# Patient Record
Sex: MALE | Race: WHITE | Employment: FULL TIME | URBAN - METROPOLITAN AREA
[De-identification: names, ages, dates, MRNs, and addresses within clinical notes are randomized per-mention and may not be internally consistent; named-entity substitution may affect disease eponyms.]

---

## 2017-03-24 RX ORDER — VALSARTAN 320 MG/1
320 TABLET ORAL DAILY
Qty: 90 TABLET | Refills: 0 | Status: SHIPPED | OUTPATIENT
Start: 2017-03-24 | End: 2017-05-15 | Stop reason: SDUPTHER

## 2017-05-15 RX ORDER — VALSARTAN 320 MG/1
TABLET ORAL
Qty: 90 TABLET | Refills: 0 | Status: SHIPPED | OUTPATIENT
Start: 2017-05-15 | End: 2017-10-12 | Stop reason: SDUPTHER

## 2017-12-23 DIAGNOSIS — I10 ESSENTIAL HYPERTENSION: ICD-10-CM

## 2017-12-23 DIAGNOSIS — E78.5 DYSLIPIDEMIA: ICD-10-CM

## 2017-12-23 LAB
ALBUMIN SERPL-MCNC: 4.3 G/DL (ref 3.9–4.9)
ALP BLD-CCNC: 72 U/L (ref 35–104)
ALT SERPL-CCNC: 28 U/L (ref 0–41)
ANION GAP SERPL CALCULATED.3IONS-SCNC: 10 MEQ/L (ref 7–13)
AST SERPL-CCNC: 16 U/L (ref 0–40)
BASOPHILS ABSOLUTE: 0.1 K/UL (ref 0–0.2)
BASOPHILS RELATIVE PERCENT: 0.8 %
BILIRUB SERPL-MCNC: 0.4 MG/DL (ref 0–1.2)
BUN BLDV-MCNC: 15 MG/DL (ref 6–20)
CALCIUM SERPL-MCNC: 9.2 MG/DL (ref 8.6–10.2)
CHLORIDE BLD-SCNC: 100 MEQ/L (ref 98–107)
CHOLESTEROL, TOTAL: 167 MG/DL (ref 0–199)
CO2: 29 MEQ/L (ref 22–29)
CREAT SERPL-MCNC: 0.78 MG/DL (ref 0.7–1.2)
EOSINOPHILS ABSOLUTE: 0.2 K/UL (ref 0–0.7)
EOSINOPHILS RELATIVE PERCENT: 1.8 %
GFR AFRICAN AMERICAN: >60
GFR NON-AFRICAN AMERICAN: >60
GLOBULIN: 2.8 G/DL (ref 2.3–3.5)
GLUCOSE BLD-MCNC: 102 MG/DL (ref 74–109)
HCT VFR BLD CALC: 43.2 % (ref 42–52)
HDLC SERPL-MCNC: 38 MG/DL (ref 40–59)
HEMOGLOBIN: 14.3 G/DL (ref 14–18)
LDL CHOLESTEROL CALCULATED: 117 MG/DL (ref 0–129)
LYMPHOCYTES ABSOLUTE: 2.5 K/UL (ref 1–4.8)
LYMPHOCYTES RELATIVE PERCENT: 28.4 %
MCH RBC QN AUTO: 28.8 PG (ref 27–31.3)
MCHC RBC AUTO-ENTMCNC: 33.1 % (ref 33–37)
MCV RBC AUTO: 86.9 FL (ref 80–100)
MONOCYTES ABSOLUTE: 0.6 K/UL (ref 0.2–0.8)
MONOCYTES RELATIVE PERCENT: 6.6 %
NEUTROPHILS ABSOLUTE: 5.4 K/UL (ref 1.4–6.5)
NEUTROPHILS RELATIVE PERCENT: 62.4 %
PDW BLD-RTO: 13.8 % (ref 11.5–14.5)
PLATELET # BLD: 233 K/UL (ref 130–400)
POTASSIUM SERPL-SCNC: 4.3 MEQ/L (ref 3.5–5.1)
RBC # BLD: 4.97 M/UL (ref 4.7–6.1)
SODIUM BLD-SCNC: 139 MEQ/L (ref 132–144)
TOTAL PROTEIN: 7.1 G/DL (ref 6.4–8.1)
TRIGL SERPL-MCNC: 60 MG/DL (ref 0–200)
WBC # BLD: 8.7 K/UL (ref 4.8–10.8)

## 2018-01-03 ENCOUNTER — OFFICE VISIT (OUTPATIENT)
Dept: FAMILY MEDICINE CLINIC | Age: 33
End: 2018-01-03

## 2018-01-03 VITALS
HEIGHT: 74 IN | WEIGHT: 315 LBS | DIASTOLIC BLOOD PRESSURE: 84 MMHG | TEMPERATURE: 98.4 F | BODY MASS INDEX: 40.43 KG/M2 | SYSTOLIC BLOOD PRESSURE: 134 MMHG | OXYGEN SATURATION: 98 % | RESPIRATION RATE: 14 BRPM | HEART RATE: 84 BPM

## 2018-01-03 DIAGNOSIS — E78.5 DYSLIPIDEMIA: ICD-10-CM

## 2018-01-03 DIAGNOSIS — I10 ESSENTIAL HYPERTENSION: Primary | ICD-10-CM

## 2018-01-03 PROCEDURE — G8427 DOCREV CUR MEDS BY ELIG CLIN: HCPCS | Performed by: FAMILY MEDICINE

## 2018-01-03 PROCEDURE — 99213 OFFICE O/P EST LOW 20 MIN: CPT | Performed by: FAMILY MEDICINE

## 2018-01-03 PROCEDURE — G8417 CALC BMI ABV UP PARAM F/U: HCPCS | Performed by: FAMILY MEDICINE

## 2018-01-03 PROCEDURE — G8484 FLU IMMUNIZE NO ADMIN: HCPCS | Performed by: FAMILY MEDICINE

## 2018-01-03 PROCEDURE — 1036F TOBACCO NON-USER: CPT | Performed by: FAMILY MEDICINE

## 2018-01-03 RX ORDER — VALSARTAN 320 MG/1
320 TABLET ORAL DAILY
Qty: 90 TABLET | Refills: 1 | Status: SHIPPED | OUTPATIENT
Start: 2018-01-03 | End: 2018-06-27 | Stop reason: SDUPTHER

## 2018-01-03 ASSESSMENT — ENCOUNTER SYMPTOMS
SHORTNESS OF BREATH: 0
ABDOMINAL PAIN: 0

## 2018-01-03 NOTE — PROGRESS NOTES
reg. No murmur. No gallops   Pulses:Radials 2+ equal               Poster tib 1+ equal  Extremities:no edema in either leg  Gen: In no acute distress  Abdomen; B.S present. Soft  Non tender. No hepatosplenomegaly. No masses   Assessment:      1. Essential hypertension     2.  Dyslipidemia       Plan:      Orders Placed This Encounter   Medications    valsartan (DIOVAN) 320 MG tablet     Sig: Take 1 tablet by mouth daily     Dispense:  90 tablet     Refill:  1   f/u in 6 months

## 2018-06-27 ENCOUNTER — OFFICE VISIT (OUTPATIENT)
Dept: FAMILY MEDICINE CLINIC | Age: 33
End: 2018-06-27
Payer: COMMERCIAL

## 2018-06-27 VITALS
OXYGEN SATURATION: 100 % | RESPIRATION RATE: 14 BRPM | HEIGHT: 74 IN | HEART RATE: 90 BPM | WEIGHT: 315 LBS | DIASTOLIC BLOOD PRESSURE: 86 MMHG | SYSTOLIC BLOOD PRESSURE: 138 MMHG | TEMPERATURE: 97.6 F | BODY MASS INDEX: 40.43 KG/M2

## 2018-06-27 DIAGNOSIS — E78.5 DYSLIPIDEMIA: ICD-10-CM

## 2018-06-27 DIAGNOSIS — I10 ESSENTIAL HYPERTENSION: Primary | ICD-10-CM

## 2018-06-27 PROCEDURE — G8427 DOCREV CUR MEDS BY ELIG CLIN: HCPCS | Performed by: FAMILY MEDICINE

## 2018-06-27 PROCEDURE — G8417 CALC BMI ABV UP PARAM F/U: HCPCS | Performed by: FAMILY MEDICINE

## 2018-06-27 PROCEDURE — 1036F TOBACCO NON-USER: CPT | Performed by: FAMILY MEDICINE

## 2018-06-27 PROCEDURE — 99213 OFFICE O/P EST LOW 20 MIN: CPT | Performed by: FAMILY MEDICINE

## 2018-06-27 RX ORDER — METOPROLOL SUCCINATE 25 MG/1
25 TABLET, EXTENDED RELEASE ORAL DAILY
Qty: 30 TABLET | Refills: 1 | Status: SHIPPED | OUTPATIENT
Start: 2018-06-27 | End: 2018-08-08 | Stop reason: SDUPTHER

## 2018-06-27 RX ORDER — VALSARTAN 320 MG/1
320 TABLET ORAL DAILY
Qty: 90 TABLET | Refills: 1 | Status: SHIPPED | OUTPATIENT
Start: 2018-06-27 | End: 2018-08-08

## 2018-06-27 ASSESSMENT — ENCOUNTER SYMPTOMS
SHORTNESS OF BREATH: 0
ABDOMINAL PAIN: 0

## 2018-06-27 ASSESSMENT — PATIENT HEALTH QUESTIONNAIRE - PHQ9
SUM OF ALL RESPONSES TO PHQ QUESTIONS 1-9: 0
2. FEELING DOWN, DEPRESSED OR HOPELESS: 0
SUM OF ALL RESPONSES TO PHQ9 QUESTIONS 1 & 2: 0
1. LITTLE INTEREST OR PLEASURE IN DOING THINGS: 0

## 2018-07-30 ENCOUNTER — TELEPHONE (OUTPATIENT)
Dept: FAMILY MEDICINE CLINIC | Age: 33
End: 2018-07-30

## 2018-07-30 RX ORDER — IRBESARTAN 300 MG/1
300 TABLET ORAL DAILY
Qty: 30 TABLET | Refills: 3 | Status: SHIPPED | OUTPATIENT
Start: 2018-07-30 | End: 2018-08-08 | Stop reason: SDUPTHER

## 2018-08-08 ENCOUNTER — OFFICE VISIT (OUTPATIENT)
Dept: FAMILY MEDICINE CLINIC | Age: 33
End: 2018-08-08
Payer: COMMERCIAL

## 2018-08-08 VITALS
TEMPERATURE: 97.8 F | RESPIRATION RATE: 14 BRPM | HEIGHT: 74 IN | DIASTOLIC BLOOD PRESSURE: 92 MMHG | BODY MASS INDEX: 40.43 KG/M2 | HEART RATE: 94 BPM | OXYGEN SATURATION: 100 % | WEIGHT: 315 LBS | SYSTOLIC BLOOD PRESSURE: 138 MMHG

## 2018-08-08 DIAGNOSIS — I10 ESSENTIAL HYPERTENSION: Primary | ICD-10-CM

## 2018-08-08 PROCEDURE — G8427 DOCREV CUR MEDS BY ELIG CLIN: HCPCS | Performed by: FAMILY MEDICINE

## 2018-08-08 PROCEDURE — 1036F TOBACCO NON-USER: CPT | Performed by: FAMILY MEDICINE

## 2018-08-08 PROCEDURE — G8417 CALC BMI ABV UP PARAM F/U: HCPCS | Performed by: FAMILY MEDICINE

## 2018-08-08 PROCEDURE — 99213 OFFICE O/P EST LOW 20 MIN: CPT | Performed by: FAMILY MEDICINE

## 2018-08-08 RX ORDER — IRBESARTAN 300 MG/1
300 TABLET ORAL DAILY
Qty: 90 TABLET | Refills: 0 | Status: SHIPPED | OUTPATIENT
Start: 2018-08-08 | End: 2018-10-10 | Stop reason: SDUPTHER

## 2018-08-08 RX ORDER — METOPROLOL SUCCINATE 25 MG/1
25 TABLET, EXTENDED RELEASE ORAL DAILY
Qty: 30 TABLET | Refills: 1 | Status: SHIPPED | OUTPATIENT
Start: 2018-08-08 | End: 2018-09-13 | Stop reason: SDUPTHER

## 2018-08-08 NOTE — PROGRESS NOTES
25 MG extended release tablet     Sig: Take 1 tablet by mouth daily     Dispense:  30 tablet     Refill:  1    irbesartan (AVAPRO) 300 MG tablet     Sig: Take 1 tablet by mouth daily     Dispense:  90 tablet     Refill:  0   f/u in 6 weeks

## 2018-09-13 NOTE — TELEPHONE ENCOUNTER
Patient is out completely, he uses a mail away pharmacy. Would like to know if you can send a partial RX to [facility] on [address] until he gets his prescription in the mail. Please advise.

## 2018-09-14 RX ORDER — METOPROLOL SUCCINATE 25 MG/1
25 TABLET, EXTENDED RELEASE ORAL DAILY
Qty: 30 TABLET | Refills: 0 | Status: SHIPPED | OUTPATIENT
Start: 2018-09-14

## 2018-09-14 RX ORDER — METOPROLOL SUCCINATE 25 MG/1
25 TABLET, EXTENDED RELEASE ORAL DAILY
Qty: 30 TABLET | Refills: 0 | Status: SHIPPED | OUTPATIENT
Start: 2018-09-14 | End: 2018-09-14 | Stop reason: SDUPTHER

## 2018-09-14 NOTE — TELEPHONE ENCOUNTER
Pt. Completely out of metoprolol 25mg, he wants to know if he can get a partial sent to [facility] on [facility]. Until he gets his prescription in the mail? Please approve or deny.

## 2018-10-10 RX ORDER — IRBESARTAN 300 MG/1
300 TABLET ORAL DAILY
Qty: 90 TABLET | Refills: 0 | Status: SHIPPED | OUTPATIENT
Start: 2018-10-10 | End: 2018-12-17 | Stop reason: SDUPTHER

## 2018-10-12 RX ORDER — METOPROLOL SUCCINATE 25 MG/1
25 TABLET, EXTENDED RELEASE ORAL DAILY
Qty: 30 TABLET | Refills: 0 | Status: SHIPPED | OUTPATIENT
Start: 2018-10-12

## 2018-12-17 RX ORDER — IRBESARTAN 300 MG/1
300 TABLET ORAL DAILY
Qty: 90 TABLET | Refills: 0 | Status: SHIPPED | OUTPATIENT
Start: 2018-12-17

## 2023-04-21 DIAGNOSIS — K21.9 GASTRO-ESOPHAGEAL REFLUX DISEASE WITHOUT ESOPHAGITIS: ICD-10-CM

## 2023-04-21 RX ORDER — OMEPRAZOLE 40 MG/1
CAPSULE, DELAYED RELEASE ORAL
Qty: 90 CAPSULE | Refills: 3 | Status: SHIPPED | OUTPATIENT
Start: 2023-04-21 | End: 2024-04-29

## 2023-05-18 RX ORDER — GLIMEPIRIDE 1 MG/1
1 TABLET ORAL 2 TIMES DAILY
COMMUNITY
Start: 2022-09-07 | End: 2023-05-23 | Stop reason: ALTCHOICE

## 2023-05-18 RX ORDER — OMEPRAZOLE 40 MG/1
1 CAPSULE, DELAYED RELEASE ORAL DAILY
COMMUNITY
Start: 2022-09-07

## 2023-05-18 RX ORDER — IRBESARTAN 300 MG/1
300 TABLET ORAL DAILY
COMMUNITY
End: 2023-05-23 | Stop reason: DRUGHIGH

## 2023-05-18 RX ORDER — BLOOD SUGAR DIAGNOSTIC
STRIP MISCELLANEOUS
COMMUNITY
Start: 2022-09-12

## 2023-05-18 RX ORDER — FLUCONAZOLE 100 MG/1
200 TABLET ORAL
COMMUNITY
Start: 2022-09-03 | End: 2023-11-14 | Stop reason: ALTCHOICE

## 2023-05-18 RX ORDER — ORAL SEMAGLUTIDE 3 MG/1
3 TABLET ORAL DAILY
COMMUNITY
End: 2023-05-23 | Stop reason: DRUGHIGH

## 2023-05-18 RX ORDER — BLOOD-GLUCOSE METER
KIT MISCELLANEOUS
COMMUNITY
Start: 2022-09-12

## 2023-05-18 RX ORDER — LANCETS 28 GAUGE
EACH MISCELLANEOUS
COMMUNITY
Start: 2022-12-02

## 2023-05-18 RX ORDER — METFORMIN HYDROCHLORIDE 500 MG/1
1000 TABLET, EXTENDED RELEASE ORAL DAILY
COMMUNITY
End: 2023-10-28

## 2023-05-23 ENCOUNTER — OFFICE VISIT (OUTPATIENT)
Dept: PRIMARY CARE | Facility: CLINIC | Age: 38
End: 2023-05-23
Payer: COMMERCIAL

## 2023-05-23 VITALS
SYSTOLIC BLOOD PRESSURE: 122 MMHG | RESPIRATION RATE: 14 BRPM | DIASTOLIC BLOOD PRESSURE: 80 MMHG | BODY MASS INDEX: 36.98 KG/M2 | OXYGEN SATURATION: 98 % | HEART RATE: 66 BPM | HEIGHT: 72 IN | WEIGHT: 273 LBS

## 2023-05-23 DIAGNOSIS — I10 PRIMARY HYPERTENSION: ICD-10-CM

## 2023-05-23 DIAGNOSIS — E11.9 TYPE 2 DIABETES MELLITUS WITHOUT COMPLICATION, WITHOUT LONG-TERM CURRENT USE OF INSULIN (MULTI): Primary | ICD-10-CM

## 2023-05-23 LAB — POC HEMOGLOBIN A1C: 5 % (ref 4.2–6.5)

## 2023-05-23 PROCEDURE — 3079F DIAST BP 80-89 MM HG: CPT | Performed by: INTERNAL MEDICINE

## 2023-05-23 PROCEDURE — 99213 OFFICE O/P EST LOW 20 MIN: CPT | Performed by: INTERNAL MEDICINE

## 2023-05-23 PROCEDURE — 83036 HEMOGLOBIN GLYCOSYLATED A1C: CPT | Performed by: INTERNAL MEDICINE

## 2023-05-23 PROCEDURE — 3074F SYST BP LT 130 MM HG: CPT | Performed by: INTERNAL MEDICINE

## 2023-05-23 PROCEDURE — 4010F ACE/ARB THERAPY RXD/TAKEN: CPT | Performed by: INTERNAL MEDICINE

## 2023-05-23 RX ORDER — ORAL SEMAGLUTIDE 7 MG/1
7 TABLET ORAL DAILY
Qty: 90 TABLET | Refills: 3 | Status: SHIPPED | OUTPATIENT
Start: 2023-05-23

## 2023-05-23 RX ORDER — IRBESARTAN 150 MG/1
150 TABLET ORAL NIGHTLY
Qty: 90 TABLET | Refills: 3 | Status: SHIPPED | OUTPATIENT
Start: 2023-05-23 | End: 2024-05-21 | Stop reason: SDUPTHER

## 2023-05-23 ASSESSMENT — PATIENT HEALTH QUESTIONNAIRE - PHQ9
2. FEELING DOWN, DEPRESSED OR HOPELESS: NOT AT ALL
SUM OF ALL RESPONSES TO PHQ9 QUESTIONS 1 AND 2: 0
1. LITTLE INTEREST OR PLEASURE IN DOING THINGS: NOT AT ALL

## 2023-05-23 ASSESSMENT — ENCOUNTER SYMPTOMS
FEVER: 0
MYALGIAS: 0
CHILLS: 0
SHORTNESS OF BREATH: 0
COUGH: 0
JOINT SWELLING: 0
NAUSEA: 0
DIARRHEA: 0
VOMITING: 0
CONSTIPATION: 0
DIAPHORESIS: 0

## 2023-05-23 NOTE — PATIENT INSTRUCTIONS
RECOMMEND DECREASE IRBESARTAN  MG DAILY, THIS SHOULD HELP REDUCE THE LIGHTHEADEDNESS YOU ARE EXPERIENCING AFTER RECLINING    2.  RECOMMEND INCREASE RYBELSUS TO 7 MG DAILY, THIS IS THE RECOMMENDED CONTINUATION DOSE AND YOU MIGHT FIND IT EASIER TO LOSE WEIGHT AND KEEP IT OFF    3.  A1C TODAY IS 5.0% WHICH AGAIN FALLS INTO ANON-DIABETIC RANGE.  SO EXCELLENT JOB ON CONTROLLING YOUR DIABETES.    4.  FOLLOW UP 6 MONTHS OR AS NEEDED

## 2023-05-23 NOTE — PROGRESS NOTES
Subjective   Thomas Morin is a 37 y.o. male who presents for FOLLOW UP     HPI   WATHCHING DIET    EXERCISE AND STARTED RUNNING WITH SON,SON AND DID 2 5K'S THIS YEAR ALREADY    IF LAY IN RECLINER, AND THEN STAND UP QUICKLY, GET LIGHTHEADED FOR A FEW SECONDS      Review of Systems   Constitutional:  Negative for chills, diaphoresis and fever.   Respiratory:  Negative for cough and shortness of breath.    Cardiovascular:  Negative for chest pain and leg swelling.   Gastrointestinal:  Negative for constipation, diarrhea, nausea and vomiting.   Musculoskeletal:  Negative for joint swelling and myalgias.       Objective   /80   Pulse 66   Resp 14   Ht 1.829 m (6')   Wt 124 kg (273 lb)   SpO2 98%   BMI 37.03 kg/m²     Physical Exam  Vitals reviewed.   Constitutional:       General: He is not in acute distress.     Appearance: He is not ill-appearing.   Cardiovascular:      Rate and Rhythm: Normal rate and regular rhythm.      Pulses: Normal pulses.      Heart sounds:      No gallop.   Pulmonary:      Breath sounds: Normal breath sounds. No wheezing, rhonchi or rales.   Abdominal:      General: Abdomen is flat. Bowel sounds are normal.      Palpations: Abdomen is soft.      Tenderness: There is no guarding or rebound.   Musculoskeletal:      Right lower leg: No edema.      Left lower leg: No edema.         Assessment/Plan   Problem List Items Addressed This Visit    None  Visit Diagnoses       Type 2 diabetes mellitus without complication, without long-term current use of insulin (CMS/[facility])    -  Primary    Relevant Medications    semaglutide (Rybelsus) 7 mg tablet    Other Relevant Orders    POCT glycosylated hemoglobin (Hb A1C) manually resulted    Follow Up In [facility] - PCP    Primary hypertension        Relevant Medications    irbesartan (Avapro) 150 mg tablet          Patient Instructions    RECOMMEND DECREASE IRBESARTAN  MG DAILY, THIS SHOULD HELP REDUCE THE LIGHTHEADEDNESS YOU ARE  EXPERIENCING AFTER RECLINING    2.  RECOMMEND INCREASE RYBELSUS TO 7 MG DAILY, THIS IS THE RECOMMENDED CONTINUATION DOSE AND YOU MIGHT FIND IT EASIER TO LOSE WEIGHT AND KEEP IT OFF    3.  A1C TODAY IS 5.0% WHICH AGAIN FALLS INTO ANON-DIABETIC RANGE.  SO EXCELLENT JOB ON CONTROLLING YOUR DIABETES.    4.  FOLLOW UP 6 MONTHS OR AS NEEDED

## 2023-10-27 DIAGNOSIS — E11.628 TYPE 2 DIABETES MELLITUS WITH OTHER SKIN COMPLICATIONS (MULTI): ICD-10-CM

## 2023-10-28 RX ORDER — METFORMIN HYDROCHLORIDE 500 MG/1
1000 TABLET, EXTENDED RELEASE ORAL DAILY
Qty: 180 TABLET | Refills: 3 | Status: SHIPPED | OUTPATIENT
Start: 2023-10-28 | End: 2024-05-21 | Stop reason: SDUPTHER

## 2023-11-12 PROBLEM — K21.9 GERD (GASTROESOPHAGEAL REFLUX DISEASE): Status: ACTIVE | Noted: 2023-11-12

## 2023-11-12 PROBLEM — G47.30 SLEEP APNEA: Status: ACTIVE | Noted: 2023-11-12

## 2023-11-12 PROBLEM — E11.628 TYPE 2 DIABETES MELLITUS WITH SKIN COMPLICATION (MULTI): Status: ACTIVE | Noted: 2023-11-12

## 2023-11-12 PROBLEM — R06.81 APNEA: Status: ACTIVE | Noted: 2023-11-12

## 2023-11-12 PROBLEM — G47.9 SLEEP DIFFICULTIES: Status: ACTIVE | Noted: 2023-11-12

## 2023-11-12 PROBLEM — E10.9 TYPE 1 DIABETES MELLITUS (MULTI): Status: RESOLVED | Noted: 2023-11-12 | Resolved: 2023-11-12

## 2023-11-12 PROBLEM — I10 HYPERTENSION: Status: ACTIVE | Noted: 2023-11-12

## 2023-11-12 PROBLEM — R06.83 PRIMARY SNORING: Status: ACTIVE | Noted: 2023-11-12

## 2023-11-12 PROBLEM — B37.2 MONILIASIS SKIN: Status: ACTIVE | Noted: 2023-11-12

## 2023-11-12 PROBLEM — B36.0 TINEA VERSICOLOR: Status: ACTIVE | Noted: 2023-11-12

## 2023-11-12 PROBLEM — R06.83 SNORING: Status: ACTIVE | Noted: 2023-11-12

## 2023-11-12 PROBLEM — R06.81 WITNESSED EPISODE OF APNEA: Status: ACTIVE | Noted: 2023-11-12

## 2023-11-12 PROBLEM — E66.813 CLASS 3 SEVERE OBESITY DUE TO EXCESS CALORIES WITH SERIOUS COMORBIDITY AND BODY MASS INDEX (BMI) OF 40.0 TO 44.9 IN ADULT: Status: ACTIVE | Noted: 2023-11-12

## 2023-11-12 PROBLEM — E66.01 SEVERE OBESITY (MULTI): Status: ACTIVE | Noted: 2023-11-12

## 2023-11-12 PROBLEM — E66.9 OBESITY, CLASS II, BMI 35-39.9: Status: ACTIVE | Noted: 2019-05-07

## 2023-11-12 PROBLEM — E66.812 OBESITY, CLASS II, BMI 35-39.9: Status: ACTIVE | Noted: 2019-05-07

## 2023-11-12 PROBLEM — E10.9 TYPE 1 DIABETES MELLITUS (MULTI): Status: ACTIVE | Noted: 2023-11-12

## 2023-11-12 PROBLEM — E66.01 CLASS 3 SEVERE OBESITY DUE TO EXCESS CALORIES WITH SERIOUS COMORBIDITY AND BODY MASS INDEX (BMI) OF 40.0 TO 44.9 IN ADULT (MULTI): Status: ACTIVE | Noted: 2023-11-12

## 2023-11-12 PROBLEM — Q55.1: Status: ACTIVE | Noted: 2019-05-08

## 2023-11-12 PROBLEM — N48.1 BALANITIS: Status: ACTIVE | Noted: 2023-11-12

## 2023-11-12 RX ORDER — DOXYLAMINE SUCCINATE 25 MG
1 TABLET ORAL 2 TIMES DAILY
COMMUNITY
Start: 2022-09-03 | End: 2023-11-14 | Stop reason: ALTCHOICE

## 2023-11-12 RX ORDER — IRBESARTAN 300 MG/1
300 TABLET ORAL DAILY
COMMUNITY
Start: 2018-12-17 | End: 2023-11-14 | Stop reason: ALTCHOICE

## 2023-11-12 RX ORDER — METOPROLOL SUCCINATE 25 MG/1
1 TABLET, EXTENDED RELEASE ORAL DAILY
COMMUNITY
Start: 2018-09-14

## 2023-11-14 ENCOUNTER — OFFICE VISIT (OUTPATIENT)
Dept: PRIMARY CARE | Facility: CLINIC | Age: 38
End: 2023-11-14
Payer: COMMERCIAL

## 2023-11-14 VITALS
DIASTOLIC BLOOD PRESSURE: 80 MMHG | SYSTOLIC BLOOD PRESSURE: 128 MMHG | OXYGEN SATURATION: 98 % | RESPIRATION RATE: 14 BRPM | BODY MASS INDEX: 39.01 KG/M2 | HEIGHT: 72 IN | WEIGHT: 288 LBS | HEART RATE: 97 BPM

## 2023-11-14 DIAGNOSIS — E11.9 TYPE 2 DIABETES MELLITUS WITHOUT COMPLICATION, WITHOUT LONG-TERM CURRENT USE OF INSULIN (MULTI): Primary | ICD-10-CM

## 2023-11-14 DIAGNOSIS — E55.9 MILD VITAMIN D DEFICIENCY: ICD-10-CM

## 2023-11-14 DIAGNOSIS — R63.5 WEIGHT GAIN: ICD-10-CM

## 2023-11-14 LAB — POC HEMOGLOBIN A1C: 5.3 % (ref 4.2–6.5)

## 2023-11-14 PROCEDURE — 99213 OFFICE O/P EST LOW 20 MIN: CPT | Performed by: INTERNAL MEDICINE

## 2023-11-14 PROCEDURE — 83036 HEMOGLOBIN GLYCOSYLATED A1C: CPT | Performed by: INTERNAL MEDICINE

## 2023-11-14 PROCEDURE — 1036F TOBACCO NON-USER: CPT | Performed by: INTERNAL MEDICINE

## 2023-11-14 PROCEDURE — 3074F SYST BP LT 130 MM HG: CPT | Performed by: INTERNAL MEDICINE

## 2023-11-14 PROCEDURE — 4010F ACE/ARB THERAPY RXD/TAKEN: CPT | Performed by: INTERNAL MEDICINE

## 2023-11-14 PROCEDURE — 3079F DIAST BP 80-89 MM HG: CPT | Performed by: INTERNAL MEDICINE

## 2023-11-14 ASSESSMENT — PATIENT HEALTH QUESTIONNAIRE - PHQ9
2. FEELING DOWN, DEPRESSED OR HOPELESS: NOT AT ALL
1. LITTLE INTEREST OR PLEASURE IN DOING THINGS: NOT AT ALL
SUM OF ALL RESPONSES TO PHQ9 QUESTIONS 1 AND 2: 0

## 2023-11-14 ASSESSMENT — ENCOUNTER SYMPTOMS
CHILLS: 0
MYALGIAS: 0
DIARRHEA: 0
FEVER: 0
CONSTIPATION: 0
SHORTNESS OF BREATH: 0
DIAPHORESIS: 0
VOMITING: 0
JOINT SWELLING: 0
COUGH: 0
NAUSEA: 0

## 2023-11-14 NOTE — PATIENT INSTRUCTIONS
FASTING LABS ARE ORDERED FOR YOU    2.  A1C TODAY 5.3% UP SLIGHTLY FROM 5.0% LAST VISIT, BUT STILL EXCELLENT DIABETIC CONTROL    3.  STRONGLY URGE YOU TO MAKE TIME FOR YOURSELF AND RESUME REGULAR EXERCISE TO KEEP DIABETES CONTROLLED.  ONGOING WEIGHT LOSS WILL CERTAINLY HELP YOU    4.  IF FLU BECOMES A PROBLEM THIS WINTER, THEN PLEASE COME BACK FOR FLU SHOT OR [facility] FOR IT    5.  FOLLOW UP 6 MONTHS OR AS NEEDED.

## 2023-11-14 NOTE — PROGRESS NOTES
"Subjective   Dipesh Morin \"Thomas\" is a 38 y.o. male who presents for  FOLLOW UP   DEFERS FLU SHOT     HPI   NO PROBLEMS OR COMPLAINTS    TRYING TO EXERCISE LIKE HE USED TO    GLUCOSES CHECK OCCASIONALLY, LAST CHECK 80'S    GOT EYES CHECKED LOOK REALLY GOOD.      Review of Systems   Constitutional:  Negative for chills, diaphoresis and fever.   Respiratory:  Negative for cough and shortness of breath.    Cardiovascular:  Negative for chest pain and leg swelling.   Gastrointestinal:  Negative for constipation, diarrhea, nausea and vomiting.   Musculoskeletal:  Negative for joint swelling and myalgias.       Objective   /80   Pulse 97   Resp 14   Ht 1.829 m (6')   Wt 131 kg (288 lb) Comment: VERBAL  SpO2 98%   BMI 39.06 kg/m²     Physical Exam  Vitals reviewed.   Constitutional:       General: He is not in acute distress.     Appearance: He is not ill-appearing.   Cardiovascular:      Rate and Rhythm: Normal rate and regular rhythm.      Pulses: Normal pulses.      Heart sounds:      No gallop.   Pulmonary:      Breath sounds: Normal breath sounds. No wheezing, rhonchi or rales.   Abdominal:      General: Abdomen is flat. Bowel sounds are normal.      Palpations: Abdomen is soft.      Tenderness: There is no guarding or rebound.   Musculoskeletal:      Right lower leg: No edema.      Left lower leg: No edema.         Assessment/Plan   Problem List Items Addressed This Visit    None  Visit Diagnoses       Type 2 diabetes mellitus without complication, without long-term current use of insulin (CMS/[facility])    -  Primary    Relevant Orders    POCT glycosylated hemoglobin (Hb A1C) manually resulted    Comprehensive Metabolic Panel    CBC    Lipid Panel    Protein, Urine Random    Weight gain        Relevant Orders    TSH with reflex to Free T4 if abnormal    Mild vitamin D deficiency        Relevant Orders    Vitamin D 25-Hydroxy,Total (for eval of Vitamin D levels)          Patient Instructions    FASTING LABS " ARE ORDERED FOR YOU    2.  A1C TODAY 5.3% UP SLIGHTLY FROM 5.0% LAST VISIT, BUT STILL EXCELLENT DIABETIC CONTROL    3.  STRONGLY URGE YOU TO MAKE TIME FOR YOURSELF AND RESUME REGULAR EXERCISE TO KEEP DIABETES CONTROLLED.  ONGOING WEIGHT LOSS WILL CERTAINLY HELP YOU    4.  IF FLU BECOMES A PROBLEM THIS WINTER, THEN PLEASE COME BACK FOR FLU SHOT OR GOTO PHARMACY FOR IT    5.  FOLLOW UP 6 MONTHS OR AS NEEDED.

## 2024-02-02 ENCOUNTER — APPOINTMENT (OUTPATIENT)
Dept: GENERAL RADIOLOGY | Age: 39
End: 2024-02-02
Payer: COMMERCIAL

## 2024-02-02 ENCOUNTER — HOSPITAL ENCOUNTER (EMERGENCY)
Age: 39
Discharge: HOME | End: 2024-02-02
Payer: COMMERCIAL

## 2024-02-02 VITALS
OXYGEN SATURATION: 98 % | DIASTOLIC BLOOD PRESSURE: 92 MMHG | TEMPERATURE: 98.4 F | SYSTOLIC BLOOD PRESSURE: 143 MMHG | HEART RATE: 86 BPM | RESPIRATION RATE: 18 BRPM

## 2024-02-02 DIAGNOSIS — I10 ESSENTIAL HYPERTENSION: ICD-10-CM

## 2024-02-02 DIAGNOSIS — S16.1XXA STRAIN OF NECK MUSCLE, INITIAL ENCOUNTER: Primary | ICD-10-CM

## 2024-02-02 LAB
ALBUMIN SERPL-MCNC: 4.4 G/DL (ref 3.5–4.6)
ALP SERPL-CCNC: 102 U/L (ref 35–104)
ALT SERPL-CCNC: 33 U/L (ref 0–41)
ANION GAP SERPL CALCULATED.3IONS-SCNC: 9 MEQ/L (ref 9–15)
AST SERPL-CCNC: 20 U/L (ref 0–40)
BASOPHILS # BLD: 0.1 K/UL (ref 0–0.2)
BASOPHILS NFR BLD: 0.7 %
BILIRUB SERPL-MCNC: 0.3 MG/DL (ref 0.2–0.7)
BUN SERPL-MCNC: 12 MG/DL (ref 6–20)
CALCIUM SERPL-MCNC: 9.1 MG/DL (ref 8.5–9.9)
CHLORIDE SERPL-SCNC: 103 MEQ/L (ref 95–107)
CO2 SERPL-SCNC: 26 MEQ/L (ref 20–31)
CREAT SERPL-MCNC: 0.87 MG/DL (ref 0.7–1.2)
EKG ATRIAL RATE: 88 BPM
EKG P AXIS: 59 DEGREES
EKG P-R INTERVAL: 150 MS
EKG Q-T INTERVAL: 350 MS
EKG QRS DURATION: 96 MS
EKG QTC CALCULATION (BAZETT): 423 MS
EKG R AXIS: 46 DEGREES
EKG T AXIS: 57 DEGREES
EKG VENTRICULAR RATE: 88 BPM
EOSINOPHIL # BLD: 0.2 K/UL (ref 0–0.7)
EOSINOPHIL NFR BLD: 2.2 %
ERYTHROCYTE [DISTWIDTH] IN BLOOD BY AUTOMATED COUNT: 13.5 % (ref 11.5–14.5)
GLOBULIN SER CALC-MCNC: 3.1 G/DL (ref 2.3–3.5)
GLUCOSE SERPL-MCNC: 114 MG/DL (ref 70–99)
HCT VFR BLD AUTO: 42.4 % (ref 42–52)
HGB BLD-MCNC: 14 G/DL (ref 14–18)
LYMPHOCYTES # BLD: 2 K/UL (ref 1–4.8)
LYMPHOCYTES NFR BLD: 26.4 %
MAGNESIUM SERPL-MCNC: 2 MG/DL (ref 1.7–2.4)
MCH RBC QN AUTO: 27.9 PG (ref 27–31.3)
MCHC RBC AUTO-ENTMCNC: 33 % (ref 33–37)
MCV RBC AUTO: 84.6 FL (ref 79–92.2)
MONOCYTES # BLD: 0.5 K/UL (ref 0.2–0.8)
MONOCYTES NFR BLD: 7.2 %
NEUTROPHILS # BLD: 4.7 K/UL (ref 1.4–6.5)
NEUTS SEG NFR BLD: 63.2 %
PLATELET # BLD AUTO: 230 K/UL (ref 130–400)
POTASSIUM SERPL-SCNC: 4.3 MEQ/L (ref 3.4–4.9)
PROT SERPL-MCNC: 7.5 G/DL (ref 6.3–8)
RBC # BLD AUTO: 5.01 M/UL (ref 4.7–6.1)
SODIUM SERPL-SCNC: 138 MEQ/L (ref 135–144)
TROPONIN, HIGH SENSITIVITY: <6 NG/L (ref 0–19)
TROPONIN, HIGH SENSITIVITY: <6 NG/L (ref 0–19)
WBC # BLD AUTO: 7.4 K/UL (ref 4.8–10.8)

## 2024-02-02 PROCEDURE — 80053 COMPREHEN METABOLIC PANEL: CPT

## 2024-02-02 PROCEDURE — 83735 ASSAY OF MAGNESIUM: CPT

## 2024-02-02 PROCEDURE — 84484 ASSAY OF TROPONIN QUANT: CPT

## 2024-02-02 PROCEDURE — 96374 THER/PROPH/DIAG INJ IV PUSH: CPT

## 2024-02-02 PROCEDURE — 36415 COLL VENOUS BLD VENIPUNCTURE: CPT

## 2024-02-02 PROCEDURE — 71045 X-RAY EXAM CHEST 1 VIEW: CPT

## 2024-02-02 PROCEDURE — 99285 EMERGENCY DEPT VISIT HI MDM: CPT

## 2024-02-02 PROCEDURE — 85025 COMPLETE CBC W/AUTO DIFF WBC: CPT

## 2024-02-02 PROCEDURE — 96372 THER/PROPH/DIAG INJ SC/IM: CPT

## 2024-02-02 PROCEDURE — 6360000002 HC RX W HCPCS

## 2024-02-02 RX ORDER — ORPHENADRINE CITRATE 100 MG/1
100 TABLET, EXTENDED RELEASE ORAL 2 TIMES DAILY PRN
Qty: 20 TABLET | Refills: 0 | Status: SHIPPED | OUTPATIENT
Start: 2024-02-02 | End: 2024-02-12

## 2024-02-02 RX ORDER — KETOROLAC TROMETHAMINE 15 MG/ML
15 INJECTION, SOLUTION INTRAMUSCULAR; INTRAVENOUS ONCE
Status: COMPLETED | OUTPATIENT
Start: 2024-02-02 | End: 2024-02-02

## 2024-02-02 RX ORDER — LABETALOL HYDROCHLORIDE 5 MG/ML
20 INJECTION, SOLUTION INTRAVENOUS ONCE
Status: DISCONTINUED | OUTPATIENT
Start: 2024-02-02 | End: 2024-02-02

## 2024-02-02 RX ORDER — NAPROXEN 500 MG/1
500 TABLET ORAL 2 TIMES DAILY PRN
Qty: 60 TABLET | Refills: 0 | Status: SHIPPED | OUTPATIENT
Start: 2024-02-02

## 2024-02-02 RX ORDER — ORPHENADRINE CITRATE 30 MG/ML
60 INJECTION INTRAMUSCULAR; INTRAVENOUS ONCE
Status: COMPLETED | OUTPATIENT
Start: 2024-02-02 | End: 2024-02-02

## 2024-02-02 RX ADMIN — KETOROLAC TROMETHAMINE 15 MG: 15 INJECTION, SOLUTION INTRAMUSCULAR; INTRAVENOUS at 19:34

## 2024-02-02 RX ADMIN — ORPHENADRINE CITRATE 60 MG: 60 INJECTION INTRAMUSCULAR; INTRAVENOUS at 19:10

## 2024-02-02 ASSESSMENT — ENCOUNTER SYMPTOMS
SHORTNESS OF BREATH: 0
VOMITING: 0
CHEST TIGHTNESS: 0
ABDOMINAL PAIN: 0
DIARRHEA: 0
PHOTOPHOBIA: 0
COUGH: 0
STRIDOR: 0
WHEEZING: 0
NAUSEA: 0

## 2024-02-02 ASSESSMENT — PAIN DESCRIPTION - LOCATION: LOCATION: NECK

## 2024-02-02 ASSESSMENT — PAIN - FUNCTIONAL ASSESSMENT
PAIN_FUNCTIONAL_ASSESSMENT: 0-10
PAIN_FUNCTIONAL_ASSESSMENT: ACTIVITIES ARE NOT PREVENTED

## 2024-02-02 ASSESSMENT — LIFESTYLE VARIABLES
HOW MANY STANDARD DRINKS CONTAINING ALCOHOL DO YOU HAVE ON A TYPICAL DAY: PATIENT DOES NOT DRINK
HOW OFTEN DO YOU HAVE A DRINK CONTAINING ALCOHOL: NEVER

## 2024-02-02 ASSESSMENT — PAIN DESCRIPTION - DESCRIPTORS: DESCRIPTORS: ACHING

## 2024-02-02 ASSESSMENT — PAIN DESCRIPTION - ORIENTATION: ORIENTATION: MID

## 2024-02-02 ASSESSMENT — PAIN DESCRIPTION - PAIN TYPE: TYPE: ACUTE PAIN

## 2024-02-02 ASSESSMENT — PAIN DESCRIPTION - FREQUENCY: FREQUENCY: INTERMITTENT

## 2024-02-02 NOTE — ED TRIAGE NOTES
Pt states that began having pain in L arm and neck about 0900 today   Pt states that he has history of HTN   Pt states he is taking BP medications  Pt denies any N/V/D  Pt states that when pressure applied to left shoulder it makes pain worse

## 2024-02-02 NOTE — ED PROVIDER NOTES
Basic Information   Time Seen: 5:46 PM   Primary Care Provider: Julio Otto MD     Chief Complaint   Patient presents with    Hypertension     H/O   Pt has HA, Pain in neck   Pt states that he took a flexeril and states that it did not help       HPI   Anthony Mccarthy is a 38 yrs male who presents with headache that radiates to his left shoulder and down his left arm.  Patient states pain began this morning and he is taken the muscle relaxer and put IcyHot on the area without relief.  Patient rates pain 6 out of 10 currently.  Denies any fevers, cough, congestion, shortness of breath, chest pain, abdominal pain, nausea, vomiting, diarrhea, dysuria.     Physical Exam  BP (!) 163/105 (02/02/24 1737)    Temp 98.4 °F (36.9 °C) (02/02/24 1737)    Pulse 93 (02/02/24 1737)   Resp 16 (02/02/24 1737)    SpO2 99 % (02/02/24 1737)       General: Awake and Alert, no acute distress   CV: RRR, S1, S2   Resp: LCTAB, even and non labored   Other: Tenderness to palpation at top of his neck and on the top of his shoulder.  No tenderness to palpation of left arm.  No erythema or swelling noted to any of these areas.     Impression and Plan     Labs Reviewed   CBC WITH AUTO DIFFERENTIAL   COMPREHENSIVE METABOLIC PANEL   MAGNESIUM        No orders to display      Final Impression   I have performed a medical screening exam on Anthony Mccarthy. Based on this patient's chief complaint/symptoms of   Chief Complaint   Patient presents with    Hypertension     H/O   Pt has HA, Pain in neck   Pt states that he took a flexeril and states that it did not help     and my focused exam, their care will be started and transitioned to provider when room is available       Meghana Muhammad PA-C  02/02/24 [id number]    
cardiopulmonary process.  Includes no widened mediastinum.  No consolidations, pleural effusions, pulmonary edema, cardiomegaly. [CA]   1935 Holding labetalol d/t pt /92.  [CA]   1950 Patient re-evaluated, feeling better after Toradol and Norflex. BP remains improved.  [CA]   2019 Troponin, High Sensitivity: <6  Flat.  [CA]      ED Course User Index  [CA] Leonor Sena PA       Medical Decision Making  Amount and/or Complexity of Data Reviewed  Labs: ordered. Decision-making details documented in ED Course.  Radiology: ordered.    Risk  Prescription drug management.      38-year-old male patient presents to the ED for evaluation of neck, left scapular pain, starting earlier today.  Also presents hypertensive.  History of hypertension, states compliance with medicines.  Initial /105.  Patient is in no acute distress on arrival.  Nontoxic.  Not diaphoretic.  No focal neurologic deficits.  Denies any active chest pain, shortness of breath or similar complaints.  Patient has reproducible muscular tenderness as noted above.  Neurovascularly intact to the left upper extremity.  Given hypertension and symptoms cardiac workup was pursued.  EKG is normal sinus rhythm with no acute ischemia.  Troponins were flat x 2, less than 6.  Portable chest x-ray does not demonstrate any acute cardiopulmonary process,, as well as no acute process with what is visualized of the left shoulder and scapula.  Patient was given Toradol and Norflex in the ED and symptoms are improved.  Blood pressure significantly improved without requiring any antihypertensive in the ED.  States the symptoms were primarily associated with movement.  Primarily associated with turning his neck to the left.  Overall presentation appears consistent with muscular etiology of symptoms.  Patient will be given Norflex, naproxen for home, conservative pain management strategies and precautions for return.  Agreeable.    REASSESSMENT         CRITICAL

## 2024-02-05 LAB
EKG ATRIAL RATE: 88 BPM
EKG P AXIS: 59 DEGREES
EKG P-R INTERVAL: 150 MS
EKG Q-T INTERVAL: 350 MS
EKG QRS DURATION: 96 MS
EKG QTC CALCULATION (BAZETT): 423 MS
EKG R AXIS: 46 DEGREES
EKG T AXIS: 57 DEGREES
EKG VENTRICULAR RATE: 88 BPM

## 2024-04-29 DIAGNOSIS — K21.9 GASTRO-ESOPHAGEAL REFLUX DISEASE WITHOUT ESOPHAGITIS: ICD-10-CM

## 2024-04-29 RX ORDER — OMEPRAZOLE 40 MG/1
CAPSULE, DELAYED RELEASE ORAL
Qty: 90 CAPSULE | Refills: 3 | Status: SHIPPED | OUTPATIENT
Start: 2024-04-29

## 2024-05-21 ENCOUNTER — OFFICE VISIT (OUTPATIENT)
Dept: PRIMARY CARE | Facility: CLINIC | Age: 39
End: 2024-05-21
Payer: COMMERCIAL

## 2024-05-21 VITALS
WEIGHT: 315 LBS | OXYGEN SATURATION: 96 % | DIASTOLIC BLOOD PRESSURE: 82 MMHG | RESPIRATION RATE: 14 BRPM | HEART RATE: 111 BPM | BODY MASS INDEX: 43.26 KG/M2 | SYSTOLIC BLOOD PRESSURE: 124 MMHG

## 2024-05-21 DIAGNOSIS — E11.9 CONTROLLED TYPE 2 DIABETES MELLITUS WITHOUT COMPLICATION, WITHOUT LONG-TERM CURRENT USE OF INSULIN (MULTI): ICD-10-CM

## 2024-05-21 DIAGNOSIS — E11.628 TYPE 2 DIABETES MELLITUS WITH OTHER SKIN COMPLICATIONS (MULTI): ICD-10-CM

## 2024-05-21 DIAGNOSIS — I10 PRIMARY HYPERTENSION: ICD-10-CM

## 2024-05-21 DIAGNOSIS — G47.33 OBSTRUCTIVE SLEEP APNEA SYNDROME: Primary | ICD-10-CM

## 2024-05-21 LAB — POC HEMOGLOBIN A1C: 5.7 % (ref 4.2–6.5)

## 2024-05-21 PROCEDURE — 4010F ACE/ARB THERAPY RXD/TAKEN: CPT | Performed by: INTERNAL MEDICINE

## 2024-05-21 PROCEDURE — 83036 HEMOGLOBIN GLYCOSYLATED A1C: CPT | Performed by: INTERNAL MEDICINE

## 2024-05-21 PROCEDURE — 3074F SYST BP LT 130 MM HG: CPT | Performed by: INTERNAL MEDICINE

## 2024-05-21 PROCEDURE — 3079F DIAST BP 80-89 MM HG: CPT | Performed by: INTERNAL MEDICINE

## 2024-05-21 PROCEDURE — 99214 OFFICE O/P EST MOD 30 MIN: CPT | Performed by: INTERNAL MEDICINE

## 2024-05-21 RX ORDER — METFORMIN HYDROCHLORIDE 1000 MG/1
1000 TABLET, FILM COATED, EXTENDED RELEASE ORAL DAILY
Qty: 90 TABLET | Refills: 3 | Status: SHIPPED | OUTPATIENT
Start: 2024-05-21 | End: 2025-05-21

## 2024-05-21 RX ORDER — IRBESARTAN 150 MG/1
150 TABLET ORAL NIGHTLY
Qty: 90 TABLET | Refills: 3 | Status: SHIPPED | OUTPATIENT
Start: 2024-05-21 | End: 2025-05-21

## 2024-05-21 NOTE — PROGRESS NOTES
"Subjective   Dipesh Morin \"Thomas\" is a 38 y.o. male who presents for  FOLLOW UP     HPI   GAINED 40 LB ON LAST 6-12 MONTHS    DOT PHYSICAL GOT SHORT CARDED BECAUSE NOT USING CPAP SINCE HE LOST SO MUCH WEIGHT.  NEED LETTER THAT SAYS NOT SEVERE.    SINCE LOST SO MUCH WEIGHT HE NOW HAS DIFFICULTY SLEEPING WITH THE MASK    PLAN ON RESUMING DIET/EXERCISE AND ONGOING WEIGHT LOSS      Review of Systems   Constitutional:  Positive for unexpected weight change. Negative for chills, diaphoresis and fever.   Respiratory:  Negative for cough and shortness of breath.    Cardiovascular:  Negative for chest pain and leg swelling.   Gastrointestinal:  Negative for constipation, diarrhea, nausea and vomiting.   Musculoskeletal:  Negative for joint swelling and myalgias.       Objective   /82   Pulse (!) 111   Resp 14   Wt 145 kg (319 lb)   SpO2 96%   BMI 43.26 kg/m²     Physical Exam  Vitals reviewed.   Constitutional:       General: He is not in acute distress.     Appearance: He is not ill-appearing.   Cardiovascular:      Rate and Rhythm: Normal rate and regular rhythm.      Pulses: Normal pulses.      Heart sounds:      No gallop.   Pulmonary:      Breath sounds: Normal breath sounds. No wheezing, rhonchi or rales.   Abdominal:      General: Abdomen is flat. Bowel sounds are normal.      Palpations: Abdomen is soft.      Tenderness: There is no guarding or rebound.   Musculoskeletal:      Right lower leg: No edema.      Left lower leg: No edema.         Assessment/Plan   Problem List Items Addressed This Visit       Sleep apnea - Primary    Hypertension    Relevant Medications    irbesartan (Avapro) 150 mg tablet     Other Visit Diagnoses       Controlled type 2 diabetes mellitus without complication, without long-term current use of insulin (Multi)        Relevant Orders    POCT glycosylated hemoglobin (Hb A1C) manually resulted    Follow Up In [facility] - Pharmacy    Type 2 diabetes mellitus with other " skin complications (Multi)        Relevant Medications    metFORMIN XR (Glumetza) 1,000 mg 24 hr tablet          Patient Instructions    A1C LEVEL TODAY IS 5.7% = STILL WELL CONTROLLED DIABETES    2.  PHARMACY CONSULTED TO CONTACT YOU REGARDING SWITCHING FROM RYBELSUS TO AN INJECTABLE SUCH AS MOUNJARO IN ORDER TO MAINTAIN DIABETIC CONTROL BUT PROVIDE MORE WEIGHT MANAGEMENT POWER    3.  REFILLS SENT IN FOR YOU    4.  ONGOING DIET/EXERCISE/WEIGHT LOSS AS YOU ARE DOING    5.  FOLLOW UP 6 MONTHS OR AS NEEDED.  GET LABS DRAWN WHEN YOU CAN    6.  PLEASE GET RESULTS OF SLEEP STUDY LATEST ? HOME STUDY.

## 2024-05-21 NOTE — PATIENT INSTRUCTIONS
A1C LEVEL TODAY IS 5.7% = STILL WELL CONTROLLED DIABETES    2.  PHARMACY CONSULTED TO CONTACT YOU REGARDING SWITCHING FROM RYBELSUS TO AN INJECTABLE SUCH AS MOUNJARO IN ORDER TO MAINTAIN DIABETIC CONTROL BUT PROVIDE MORE WEIGHT MANAGEMENT POWER    3.  REFILLS SENT IN FOR YOU    4.  ONGOING DIET/EXERCISE/WEIGHT LOSS AS YOU ARE DOING    5.  FOLLOW UP 6 MONTHS OR AS NEEDED.  GET LABS DRAWN WHEN YOU CAN    6.  PLEASE GET RESULTS OF SLEEP STUDY LATEST ? HOME STUDY.

## 2024-05-28 DIAGNOSIS — E11.9 TYPE 2 DIABETES MELLITUS WITHOUT COMPLICATION, WITH LONG-TERM CURRENT USE OF INSULIN (MULTI): Primary | ICD-10-CM

## 2024-05-28 DIAGNOSIS — Z79.4 TYPE 2 DIABETES MELLITUS WITHOUT COMPLICATION, WITH LONG-TERM CURRENT USE OF INSULIN (MULTI): Primary | ICD-10-CM

## 2024-05-29 ENCOUNTER — TELEPHONE (OUTPATIENT)
Dept: PRIMARY CARE | Facility: CLINIC | Age: 39
End: 2024-05-29
Payer: COMMERCIAL

## 2024-05-29 NOTE — TELEPHONE ENCOUNTER
ZAY RANDALL,  PLEASE LET MR. RAY KNOW THAT HIS SLEEP STUDY FROM 2020 SHOWED VERY SEVERE SLEEP APNEA.  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  IF YES, THEN LET ME KNOW AND I WILL ORDER THE TEST TO BE DONE WITHOUT CPAP  MASK.  ALSO FIND OUT IF HE IS USING AUTO-PAP (VARIABLE PRESSURE), OR IF HE HAS A FIXED PRESSURE CPAP MACHINE WITH ONLY ONE PRESSURE (STANDARD CPAP).  HAZEL

## 2024-05-30 ASSESSMENT — ENCOUNTER SYMPTOMS
DIAPHORESIS: 0
UNEXPECTED WEIGHT CHANGE: 1
SHORTNESS OF BREATH: 0
CONSTIPATION: 0
MYALGIAS: 0
JOINT SWELLING: 0
DIARRHEA: 0
CHILLS: 0
FEVER: 0
NAUSEA: 0
COUGH: 0
VOMITING: 0

## 2024-06-06 ENCOUNTER — TELEMEDICINE (OUTPATIENT)
Dept: PHARMACY | Facility: HOSPITAL | Age: 39
End: 2024-06-06
Payer: COMMERCIAL

## 2024-06-06 DIAGNOSIS — E11.9 CONTROLLED TYPE 2 DIABETES MELLITUS WITHOUT COMPLICATION, WITHOUT LONG-TERM CURRENT USE OF INSULIN (MULTI): ICD-10-CM

## 2024-06-06 DIAGNOSIS — E11.628 TYPE 2 DIABETES MELLITUS WITH OTHER SKIN COMPLICATION, WITHOUT LONG-TERM CURRENT USE OF INSULIN (MULTI): Primary | ICD-10-CM

## 2024-06-06 PROCEDURE — RXMED WILLOW AMBULATORY MEDICATION CHARGE

## 2024-06-06 RX ORDER — TIRZEPATIDE 2.5 MG/.5ML
2.5 INJECTION, SOLUTION SUBCUTANEOUS
Qty: 2 ML | Refills: 0 | Status: SHIPPED | OUTPATIENT
Start: 2024-06-06

## 2024-06-06 NOTE — PROGRESS NOTES
"[facility] Pharmacist Visit - Diabetes and Weight Management  Dipesh Morin \"Thomas\" is a 38 y.o. male was referred to Clinical Pharmacy Team for Diabetes and Obesity.    Referring Provider: Lang Mancia, *  PCP: Lang Mancia MD - last visit: 5/21/24, next visit: 11/19/24    Subjective   HPI  PMH significant for T2DM, obesity, SHIRA, HTN, GERD.  Patient has no known history of medullary thyroid cancer, pancreatitis, or complicated UTI.  Known DM complications include none.  Special needs/barriers to therapy: None identified      DIABETES ASSESSMENT  Medication Therapy  Current Medications: metformin XR 500mg 2 tabs daily  Previous Medications: Rybelsus 7mg, glimepiride     Adherence: Takes medication as directed and reports no missed doses  Adverse Effects: None    Risk Reducing Meds  On GLP1-RA: No  On SGLT2i: No   On ACEi/ARB: Yes   On Statin: No     Glucose Monitoring  Glucometer/CGM Type: Freestyle Lite    Previous home BG readings: None  Current home BG readings: -120    Hypoglycemia: Patient denies signs and symptoms and No BG readings < 80 mg/dL  Hyperglycemia: Denies signs and symptoms    Diabetic Eye Exam: Up to date, completed Jan 2024  Monofilament Foot Exam: Up to date, completed May 2024    Lifestyle  Diet: 3 meals/day.  BK: black coffee, eggs and dillon/sausage  LN: peanuts, fruit cup, lunchable  DN: pork chops, rice, green  Snacks: peanuts  Drinks: Water, zero-gatorade    Physical Activity: used to ride bikes, not for past 4 months      WEIGHT MANAGEMENT  BMI Readings from Last 1 Encounters:   05/21/24 43.26 kg/m²   Starting weight: 319 lbs. (5/21/24)  Current weight: 319 lbs.      Secondary Prevention  ASCVD Risk:   The 10-year ASCVD risk score (Juan Diego BURRIS, et al., 2019) is: 3.8%    Values used to calculate the score:      Age: 38 years      Sex: Male      Is Non- : No      Diabetic: Yes      Tobacco smoker: No      Systolic Blood Pressure: 124 mmHg      Is BP " "treated: Yes      HDL Cholesterol: 37 mg/dL      Total Cholesterol: 203 mg/dL  On Statin?: No, indicated for updated FLP    Immunizations Needed: Prevnar and Tdap  Tobacco Use: non-smoker      Objective   Vitals  BP Readings from Last 2 Encounters:   05/21/24 124/82   11/14/23 128/80     BMI Readings from Last 1 Encounters:   05/21/24 43.26 kg/m²      Labs  A1C  Lab Results   Component Value Date    HGBA1C 5.7 05/21/2024    HGBA1C 5.3 11/14/2023    HGBA1C 5.0 05/23/2023     BMP  Lab Results   Component Value Date    CALCIUM 9.7 09/06/2022     (L) 09/06/2022    K 3.8 09/06/2022    CO2 28 09/06/2022    CL 98 09/06/2022    BUN 15 09/06/2022    CREATININE 0.89 09/06/2022     LFTs  Lab Results   Component Value Date    ALT 39 09/06/2022    AST 23 09/06/2022    ALKPHOS 130 (H) 09/06/2022    BILITOT 0.5 09/06/2022     FLP  Lab Results   Component Value Date    TRIG 195 (H) 09/06/2022    CHOL 203 (H) 09/06/2022    LDLF 127 (H) 09/06/2022    HDL 37.0 (A) 09/06/2022     Urine Microalbumin  Lab Results   Component Value Date    MICROALBCREA SEE COMMENT 09/06/2022     Vitamin B12  No results found for: \"[id number]\"      Assessment/Plan   Problem List Items Addressed This Visit       Type 2 diabetes mellitus with skin complication (Multi) - Primary     Diabetes & Weight: Controlled with last A1c 5.7% on 5/21/24. Current regimen includes metformin XR 500mg 2 tabs daily. Rybelsus 7mg was discontinued, to be replaced by GLP with better weight loss efficacy. Home BG readings reported as -120. Patient's current weight reported as 319 lbs (BMI 43). Due to indication for weight loss, plan to start Mounjaro and titrate as tolerated. Medication counseling provided, follow-up in 3 weeks.  Start taking Mounjaro 2.5mg once weekly as directed  Continue taking metformin XR 500mg 2 tabs daily  Continue to monitor and record BG daily         Relevant Medications    tirzepatide (Mounjaro) 2.5 mg/0.5 mL pen injector    Other Relevant " Orders    Follow Up In [facility] - Pharmacy     Other Visit Diagnoses       Controlled type 2 diabetes mellitus without complication, without long-term current use of insulin (Multi)              Patient Education:  Counseled patient on relevant MOA, expectations, side effects, duration of therapy, contraindications, administration, and monitoring parameters.  All questions and concerns addressed. Contact pharmacist with any further questions or concerns prior to next appointment.    Clinical Pharmacist follow-up: 6/27/24 at 4:00pm, Telehealth visit    Loli Guzman PharmD  Clinical Pharmacist  06/07/24    Continue all meds under the continuation of care with the referring provider and clinical pharmacy team.  Verbal consent to manage patient's drug therapy was obtained from the patient. They were informed they may decline to participate or withdraw from participation in pharmacy services at any time.

## 2024-06-07 NOTE — ASSESSMENT & PLAN NOTE
Diabetes & Weight: Controlled with last A1c 5.7% on 5/21/24. Current regimen includes metformin XR 500mg 2 tabs daily. Rybelsus 7mg was discontinued, to be replaced by GLP with better weight loss efficacy. Home BG readings reported as -120. Patient's current weight reported as 319 lbs (BMI 43). Due to indication for weight loss, plan to start Mounjaro and titrate as tolerated. Medication counseling provided, follow-up in 3 weeks.  Start taking Mounjaro 2.5mg once weekly as directed  Continue taking metformin XR 500mg 2 tabs daily  Continue to monitor and record BG daily

## 2024-06-10 ENCOUNTER — PHARMACY VISIT (OUTPATIENT)
Dept: PHARMACY | Facility: CLINIC | Age: 39
End: 2024-06-10
Payer: MEDICARE

## 2024-06-27 ENCOUNTER — APPOINTMENT (OUTPATIENT)
Dept: PHARMACY | Facility: HOSPITAL | Age: 39
End: 2024-06-27
Payer: COMMERCIAL

## 2024-06-27 DIAGNOSIS — E11.628 TYPE 2 DIABETES MELLITUS WITH OTHER SKIN COMPLICATION, WITHOUT LONG-TERM CURRENT USE OF INSULIN (MULTI): ICD-10-CM

## 2024-06-27 PROCEDURE — RXMED WILLOW AMBULATORY MEDICATION CHARGE

## 2024-06-27 RX ORDER — TIRZEPATIDE 5 MG/.5ML
5 INJECTION, SOLUTION SUBCUTANEOUS
Qty: 2 ML | Refills: 0 | Status: SHIPPED | OUTPATIENT
Start: 2024-06-27

## 2024-06-27 NOTE — PROGRESS NOTES
"[facility] Pharmacist Visit - Diabetes and Weight Management  Dipesh Morin \"Thomas\" is a 39 y.o. male was referred to Clinical Pharmacy Team for Diabetes and Obesity.    Referring Provider: Lang Mancia, *  PCP: Lang Mancia MD - last visit: 5/21/24, next visit: 11/19/24    Subjective   HPI  PMH significant for T2DM, obesity, SHIRA, HTN, GERD.  Patient has no known history of medullary thyroid cancer, pancreatitis, or complicated UTI.  Known DM complications include none.  Special needs/barriers to therapy: None identified      DIABETES ASSESSMENT  Medication Therapy  Current Medications: metformin XR 500mg 2 tabs daily, Mounjaro 2.5mg once weekly (Fridays, 2 doses completed)  Previous Medications: Rybelsus 7mg, glimepiride     Adherence: Takes medication as directed and reports no missed doses  Adverse Effects: None    Risk Reducing Meds  On GLP1-RA: Yes  On SGLT2i: No   On ACEi/ARB: Yes   On Statin: No     Glucose Monitoring  Glucometer/CGM Type: Freestyle Lite    Previous home BG readings: (6/6/24) -120  Current home BG readings: -105    Hypoglycemia: Patient denies signs and symptoms and No BG readings < 70 mg/dL  Hyperglycemia: Denies signs and symptoms    Diabetic Eye Exam: Up to date, completed Jan 2024  Monofilament Foot Exam: Up to date, completed May 2024    Lifestyle  Diet: 3 meals/day. No significant changes.  BK: black coffee, eggs and dillon/sausage  LN: peanuts, fruit cup, lunchable  DN: pork chops, rice, green  Snacks: peanuts  Drinks: Water, zero-gatorade    Physical Activity: used to ride bikes, not for past 4 months      WEIGHT MANAGEMENT  BMI Readings from Last 1 Encounters:   05/21/24 43.26 kg/m²   Starting weight: 319 lbs. (5/21/24)  Current weight: Not checked recently (on vacation)      Secondary Prevention  ASCVD Risk:   The 10-year ASCVD risk score (Juan Diego BURRIS, et al., 2019) is: 4.2%    Values used to calculate the score:      Age: 39 years      Sex: Male      Is " "Non- : No      Diabetic: Yes      Tobacco smoker: No      Systolic Blood Pressure: 124 mmHg      Is BP treated: Yes      HDL Cholesterol: 37 mg/dL      Total Cholesterol: 203 mg/dL  On Statin?: No, indicated for updated FLP    Immunizations Needed: Prevnar and Tdap  Tobacco Use: non-smoker      Objective   Vitals  BP Readings from Last 2 Encounters:   05/21/24 124/82   11/14/23 128/80     BMI Readings from Last 1 Encounters:   05/21/24 43.26 kg/m²      Labs  A1C  Lab Results   Component Value Date    HGBA1C 5.7 05/21/2024    HGBA1C 5.3 11/14/2023    HGBA1C 5.0 05/23/2023     BMP  Lab Results   Component Value Date    CALCIUM 9.7 09/06/2022     (L) 09/06/2022    K 3.8 09/06/2022    CO2 28 09/06/2022    CL 98 09/06/2022    BUN 15 09/06/2022    CREATININE 0.89 09/06/2022     LFTs  Lab Results   Component Value Date    ALT 39 09/06/2022    AST 23 09/06/2022    ALKPHOS 130 (H) 09/06/2022    BILITOT 0.5 09/06/2022     FLP  Lab Results   Component Value Date    TRIG 195 (H) 09/06/2022    CHOL 203 (H) 09/06/2022    LDLF 127 (H) 09/06/2022    HDL 37.0 (A) 09/06/2022     Urine Microalbumin  Lab Results   Component Value Date    MICROALBCREA SEE COMMENT 09/06/2022     Vitamin B12  No results found for: \"[id number]\"      Assessment/Plan   Problem List Items Addressed This Visit       Type 2 diabetes mellitus with skin complication (Multi)     Diabetes & Weight: Controlled with last A1c 5.7% on 5/21/24. Current regimen includes metformin XR 500mg 2 tabs daily and Mounjaro 2.5mg once weekly. Tolerating well, no adverse effects. Home BG readings reported as -105. Patient's weight not checked recently. Due to indication for further weight loss, plan to continue titrating Mounjaro as tolerated.  Increase Mounjaro to 5mg once weekly after completing remaining doses of 2.5mg.  Continue taking metformin XR 500mg 2 tabs daily  Continue to monitor and record BG daily         Relevant Medications    " tirzepatide (Mounjaro) 5 mg/0.5 mL pen injector    Other Relevant Orders    Follow Up In [facility] - Pharmacy     Patient Education:  Counseled patient on relevant MOA, expectations, side effects, duration of therapy, contraindications, administration, and monitoring parameters.  All questions and concerns addressed. Contact pharmacist with any further questions or concerns prior to next appointment.    Clinical Pharmacist follow-up: 8/1/24 at 4:00pm, Telehealth visit    Jai SotomayorD  Clinical Pharmacist  06/27/24    Continue all meds under the continuation of care with the referring provider and clinical pharmacy team.  Verbal consent to manage patient's drug therapy was obtained from the patient. They were informed they may decline to participate or withdraw from participation in pharmacy services at any time.

## 2024-06-27 NOTE — ASSESSMENT & PLAN NOTE
Diabetes & Weight: Controlled with last A1c 5.7% on 5/21/24. Current regimen includes metformin XR 500mg 2 tabs daily and Mounjaro 2.5mg once weekly. Tolerating well, no adverse effects. Home BG readings reported as -105. Patient's weight not checked recently. Due to indication for further weight loss, plan to continue titrating Mounjaro as tolerated.  Increase Mounjaro to 5mg once weekly after completing remaining doses of 2.5mg.  Continue taking metformin XR 500mg 2 tabs daily  Continue to monitor and record BG daily

## 2024-06-28 ENCOUNTER — PHARMACY VISIT (OUTPATIENT)
Dept: PHARMACY | Facility: CLINIC | Age: 39
End: 2024-06-28
Payer: MEDICARE

## 2024-08-01 ENCOUNTER — APPOINTMENT (OUTPATIENT)
Dept: PHARMACY | Facility: HOSPITAL | Age: 39
End: 2024-08-01
Payer: COMMERCIAL

## 2024-08-01 DIAGNOSIS — E66.01 CLASS 3 SEVERE OBESITY DUE TO EXCESS CALORIES WITH SERIOUS COMORBIDITY AND BODY MASS INDEX (BMI) OF 40.0 TO 44.9 IN ADULT (MULTI): Primary | ICD-10-CM

## 2024-08-01 DIAGNOSIS — E11.628 TYPE 2 DIABETES MELLITUS WITH OTHER SKIN COMPLICATION, WITHOUT LONG-TERM CURRENT USE OF INSULIN (MULTI): ICD-10-CM

## 2024-08-01 PROCEDURE — RXMED WILLOW AMBULATORY MEDICATION CHARGE

## 2024-08-01 RX ORDER — TIRZEPATIDE 7.5 MG/.5ML
7.5 INJECTION, SOLUTION SUBCUTANEOUS
Qty: 2 ML | Refills: 0 | Status: SHIPPED | OUTPATIENT
Start: 2024-08-01

## 2024-08-01 NOTE — PROGRESS NOTES
"  Clinical Pharmacy Appointment    Patient ID: Dipesh Morin \"Rachel" is a 39 y.o. male who presents for Diabetes and Obesity.    Pt is here for Follow Up appointment.   Referring Provider: Lang Mancia, *  PCP: Lang Mancia MD, last visit: 5/21/24, next visit: 11/19/24    Subjective   HPI  PMH significant for T2DM, obesity, SHIRA, HTN, GERD.   Special needs/barriers to therapy: None identified    Medication System Management  Patients preferred pharmacy: [facility] delivery  Adherence/Organization: No current concerns  Affordability/Accessibility: No current concerns    Drug Interactions  No relevant drug interactions were noted.      DIABETES MELLITUS Type 2:    Known diabetic complications: obesity.  Hx or FH Hx of MTC/MEN2?: No, Pancreatitis?: No, Gastroparesis?: No, UTI/Yeast Infections?: No    Follows with Endocrinology?: No  Diabetic Eye Exam: Up to date, completed Jan 2024  Monofilament Foot Exam: Up to date, completed May 2024     Current diabetic medications include:  Mounjaro 5mg once weekly (Fridays, 4 doses completed)  Previous medications: Rybelsus 7mg, glimepiride     Clarifications to above regimen: Patient discontinued metformin due to hypoglycemia.  Adverse Effects: Experienced diarrhea after drinking 2 beers, otherwise no GI upset. Hypoglycemia.    Glucose Readings:  Glucometer/CGM Type: Freestyle Lite   Patient tests BG 1 times per day    Previous home BG readings: (6/27/24) -105  Current home BG readings: BG 92, 106, 81 (after discontinuing metformin)     Any episodes of hypoglycemia? Yes - Reports multiple BG readings 50-60's with headache and dizziness. Resolved since discontinuing metformin.  Did patient treat episode of hypoglycemia appropriately? Yes,    Does the patient have a prescription for ready-to-use Glucagon? No,      Lifestyle:  Diet: 3 meals/day. No significant changes.   Physical Activity: used to ride bikes, not for past 4 months     Risk Reducing " Medications:  Statin? No - indicated for updated lipid panel at follow-up  ACE-I/ARB? Yes    WEIGHT LOSS  BMI Readings from Last 1 Encounters:   05/21/24 43.26 kg/m²   Starting weight: 319 lbs. (5/21/24)   Previous weight: 319 lbs. (5/21/24)   Current weight: 294 lbs.      Preventative Care  Immunizations Needed: Prevnar and Tdap  Tobacco Use: non-smoker    Objective   Allergies   Allergen Reactions    Penicillins Unknown and Rash     Social History     Social History Narrative    Not on file      Medication Review  Current Outpatient Medications   Medication Instructions    FreeStyle Freedom Lite kit TEST EVERY DAY    FreeStyle Lancets 28 gauge USE ONCE DAILY AS DIRECTED    FreeStyle Test strip Daily RT    irbesartan (AVAPRO) 150 mg, oral, Nightly    metFORMIN (MOD) (GLUMETZA) 1,000 mg, oral, Daily    metoprolol succinate XL (Toprol-XL) 25 mg 24 hr tablet 1 tablet, oral, Daily    Mounjaro 7.5 mg, subcutaneous, Every 7 days    omeprazole (PriLOSEC) 40 mg DR capsule 1 capsule, oral, Daily    omeprazole (PriLOSEC) 40 mg DR capsule TAKE 1 CAPSULE BY MOUTH EVERY DAY    Rybelsus 7 mg, oral, Daily      Vitals  BP Readings from Last 2 Encounters:   05/21/24 124/82   11/14/23 128/80     Labs  A1C  Lab Results   Component Value Date    HGBA1C 5.7 05/21/2024    HGBA1C 5.3 11/14/2023    HGBA1C 5.0 05/23/2023     BMP  Lab Results   Component Value Date    CALCIUM 9.7 09/06/2022     (L) 09/06/2022    K 3.8 09/06/2022    CO2 28 09/06/2022    CL 98 09/06/2022    BUN 15 09/06/2022    CREATININE 0.89 09/06/2022     LFTs  Lab Results   Component Value Date    ALT 39 09/06/2022    AST 23 09/06/2022    ALKPHOS 130 (H) 09/06/2022    BILITOT 0.5 09/06/2022     FLP  Lab Results   Component Value Date    TRIG 195 (H) 09/06/2022    CHOL 203 (H) 09/06/2022    LDLF 127 (H) 09/06/2022    HDL 37.0 (A) 09/06/2022     Urine Microalbumin  Lab Results   Component Value Date    MICROALBCREA SEE COMMENT 09/06/2022     Weight Management  Wt  Readings from Last 3 Encounters:   05/21/24 145 kg (319 lb)   11/14/23 131 kg (288 lb)   05/23/23 124 kg (273 lb)      There is no height or weight on file to calculate BMI.     Assessment/Plan   Problem List Items Addressed This Visit       Class 3 severe obesity due to excess calories with serious comorbidity and body mass index (BMI) of 40.0 to 44.9 in adult (Multi) - Primary     Patient's current weight reported as 294 lbs. Has lost 25 lbs. (7.8% of TBW) since starting therapy plan.  Current regimen includes: Mounjaro 5mg once weekly.  Rationale for plan: Overall tolerating dose increase of Mounjaro. Hypoglycemia resolved with discontinuation of metformin. Due to low potential for hypoglycemia when used as sole agent, plan to continue titrating Mounjaro for weight loss.    Medication Changes:  INCREASE  Mounjaro 7.5mg once weekly          Type 2 diabetes mellitus with skin complication (Multi)     Patient's A1c is 5.7% on 5/21/24. Goal A1c <6.5%.  Patient's SMBGs at goal for past several days. Experienced hypoglycemia prior to discontinuing metformin last weekend.  Rationale for plan: BG well-controlled since discontinuing metformin, will not resume. Due to low potential for hypoglycemia when used as sole agent, plan to continue titrating Mounjaro for metabolic benefits.    STOP  Metformin         Relevant Medications    tirzepatide (Mounjaro) 7.5 mg/0.5 mL pen injector    Other Relevant Orders    Follow Up In [facility] - Pharmacy     Patient Education:  Counseled patient on relevant MOA, expectations, side effects, duration of therapy, contraindications, administration, and monitoring parameters.  All questions and concerns addressed. Contact pharmacist with any further questions or concerns prior to next appointment.    Clinical Pharmacist follow-up: 8/29/24 at 4:00pm, Telehealth visit    Thank you,  Loli Guzman, PharmD  Clinical Pharmacist  [phone]    Continue all meds under the  continuation of care with the referring provider and clinical pharmacy team.  Verbal consent to manage patient's drug therapy was obtained from the patient. They were informed they may decline to participate or withdraw from participation in pharmacy services at any time.

## 2024-08-02 NOTE — ASSESSMENT & PLAN NOTE
Patient's A1c is 5.7% on 5/21/24. Goal A1c <6.5%.  Patient's SMBGs at goal for past several days. Experienced hypoglycemia prior to discontinuing metformin last weekend.  Rationale for plan: BG well-controlled since discontinuing metformin, will not resume. Due to low potential for hypoglycemia when used as sole agent, plan to continue titrating Mounjaro for metabolic benefits.    STOP  Metformin

## 2024-08-02 NOTE — ASSESSMENT & PLAN NOTE
Patient's current weight reported as 294 lbs. Has lost 25 lbs. (7.8% of TBW) since starting therapy plan.  Current regimen includes: Mounjaro 5mg once weekly.  Rationale for plan: Overall tolerating dose increase of Mounjaro. Hypoglycemia resolved with discontinuation of metformin. Due to low potential for hypoglycemia when used as sole agent, plan to continue titrating Mounjaro for weight loss.    Medication Changes:  INCREASE  Mounjaro 7.5mg once weekly

## 2024-08-05 ENCOUNTER — PHARMACY VISIT (OUTPATIENT)
Dept: PHARMACY | Facility: CLINIC | Age: 39
End: 2024-08-05

## 2024-08-05 ENCOUNTER — PHARMACY VISIT (OUTPATIENT)
Dept: PHARMACY | Facility: CLINIC | Age: 39
End: 2024-08-05
Payer: MEDICARE

## 2024-08-29 ENCOUNTER — APPOINTMENT (OUTPATIENT)
Dept: PHARMACY | Facility: HOSPITAL | Age: 39
End: 2024-08-29
Payer: COMMERCIAL

## 2024-08-29 DIAGNOSIS — E66.01 CLASS 3 SEVERE OBESITY DUE TO EXCESS CALORIES WITH SERIOUS COMORBIDITY AND BODY MASS INDEX (BMI) OF 40.0 TO 44.9 IN ADULT (MULTI): Primary | ICD-10-CM

## 2024-08-29 DIAGNOSIS — E11.628 TYPE 2 DIABETES MELLITUS WITH OTHER SKIN COMPLICATION, WITHOUT LONG-TERM CURRENT USE OF INSULIN (MULTI): ICD-10-CM

## 2024-08-29 PROCEDURE — RXMED WILLOW AMBULATORY MEDICATION CHARGE

## 2024-08-29 RX ORDER — TIRZEPATIDE 7.5 MG/.5ML
7.5 INJECTION, SOLUTION SUBCUTANEOUS
Qty: 2 ML | Refills: 0 | Status: SHIPPED | OUTPATIENT
Start: 2024-08-29

## 2024-08-30 ENCOUNTER — PHARMACY VISIT (OUTPATIENT)
Dept: PHARMACY | Facility: CLINIC | Age: 39
End: 2024-08-30
Payer: MEDICARE

## 2024-08-30 ENCOUNTER — PHARMACY VISIT (OUTPATIENT)
Dept: PHARMACY | Facility: CLINIC | Age: 39
End: 2024-08-30

## 2024-09-04 NOTE — ASSESSMENT & PLAN NOTE
Patient's current weight reported as 286 lbs. Has lost 33 lbs. (10.3% of TBW) since starting therapy plan.  Current regimen includes: Mounjaro 7.5mg weekly  Rationale for plan: Patient tolerating dose increase of Mounjaro well. Has noticed reduced appetite and is overall feeling well. Due to adequate rate of weight loss and patient preference, plant to continue current regimen and follow-up in 4 weeks.    Medication Changes:  CONTINUE  Mounjaro 7.5mg once weekly

## 2024-09-04 NOTE — ASSESSMENT & PLAN NOTE
Patient's A1c is 5.7% on 5/21/24. Goal A1c <6.5%.   Patient's SMBGs are at goal. No further hypoglycemia.     Rationale for plan: Currently well-controlled. Titrating Mounjaro as needed for metabolic benefits.

## 2024-09-04 NOTE — PROGRESS NOTES
"  Clinical Pharmacy Appointment    Patient ID: Dipesh Morin \"Rachel" is a 39 y.o. male who presents for Obesity and Diabetes.    Pt is here for Follow Up appointment.   Referring Provider: Lang Mancia, *  PCP: Lang Mancia MD, last visit: 5/21/24, next visit: 11/19/24    Subjective   HPI  PMH significant for T2DM, obesity, SHIRA, HTN, GERD.   Special needs/barriers to therapy: None identified    Medication System Management  Patients preferred pharmacy: [facility] delivery  Adherence/Organization: No current concerns  Affordability/Accessibility: No current concerns    Drug Interactions  No relevant drug interactions were noted.      DIABETES MELLITUS Type 2:    Known diabetic complications: obesity.  Hx or FH Hx of MTC/MEN2?: No, Pancreatitis?: No, Gastroparesis?: No, UTI/Yeast Infections?: No    Follows with Endocrinology?: No  Diabetic Eye Exam: Up to date, completed Jan 2024  Monofilament Foot Exam: Up to date, completed May 2024     Current diabetic medications include:  Mounjaro 7.5mg once weekly (Fridays)  Previous medications: Rybelsus 7mg, glimepiride, metformin    Clarifications to above regimen: None  Adverse Effects: None    Glucose Readings:  Glucometer/CGM Type: Freestyle Lite   Patient tests BG 1 times per day    Previous home BG readings: (6/27/24) -105  Current home BG readings: BG 80's-100's    Any episodes of hypoglycemia? No  Did patient treat episode of hypoglycemia appropriately? N/A  Does the patient have a prescription for ready-to-use Glucagon? N/A    Lifestyle:  Diet: 3 meals/day. Has noticed reduced appetite.  Physical Activity: used to ride bikes, not for past 4 months     Risk Reducing Medications:  Statin? No - indicated for updated lipid panel at follow-up  ACE-I/ARB? Yes    WEIGHT LOSS  BMI Readings from Last 1 Encounters:   05/21/24 43.26 kg/m²   Starting weight: 319 lbs. (5/21/24)   Previous weight: 294 lbs. (8/1/24)   Current weight: 286 " lbs.      Preventative Care  Immunizations Needed: Prevnar and Tdap  Tobacco Use: non-smoker    Objective   Allergies   Allergen Reactions    Penicillins Unknown and Rash     Social History     Social History Narrative    Not on file      Medication Review  Current Outpatient Medications   Medication Instructions    FreeStyle Freedom Lite kit TEST EVERY DAY    FreeStyle Lancets 28 gauge USE ONCE DAILY AS DIRECTED    FreeStyle Test strip Daily RT    irbesartan (AVAPRO) 150 mg, oral, Nightly    metFORMIN (MOD) (GLUMETZA) 1,000 mg, oral, Daily    metoprolol succinate XL (Toprol-XL) 25 mg 24 hr tablet 1 tablet, oral, Daily    Mounjaro 7.5 mg, subcutaneous, Every 7 days    omeprazole (PriLOSEC) 40 mg DR capsule 1 capsule, oral, Daily    omeprazole (PriLOSEC) 40 mg DR capsule TAKE 1 CAPSULE BY MOUTH EVERY DAY    Rybelsus 7 mg, oral, Daily      Vitals  BP Readings from Last 2 Encounters:   05/21/24 124/82   11/14/23 128/80     Labs  A1C  Lab Results   Component Value Date    HGBA1C 5.7 05/21/2024    HGBA1C 5.3 11/14/2023    HGBA1C 5.0 05/23/2023     BMP  Lab Results   Component Value Date    CALCIUM 9.7 09/06/2022     (L) 09/06/2022    K 3.8 09/06/2022    CO2 28 09/06/2022    CL 98 09/06/2022    BUN 15 09/06/2022    CREATININE 0.89 09/06/2022     LFTs  Lab Results   Component Value Date    ALT 39 09/06/2022    AST 23 09/06/2022    ALKPHOS 130 (H) 09/06/2022    BILITOT 0.5 09/06/2022     FLP  Lab Results   Component Value Date    TRIG 195 (H) 09/06/2022    CHOL 203 (H) 09/06/2022    LDLF 127 (H) 09/06/2022    HDL 37.0 (A) 09/06/2022     Urine Microalbumin  Lab Results   Component Value Date    MICROALBCREA SEE COMMENT 09/06/2022     Weight Management  Wt Readings from Last 3 Encounters:   05/21/24 145 kg (319 lb)   11/14/23 131 kg (288 lb)   05/23/23 124 kg (273 lb)      There is no height or weight on file to calculate BMI.     Assessment/Plan   Problem List Items Addressed This Visit       Class 3 severe obesity due  to excess calories with serious comorbidity and body mass index (BMI) of 40.0 to 44.9 in adult (Multi) - Primary     Patient's current weight reported as 286 lbs. Has lost 33 lbs. (10.3% of TBW) since starting therapy plan.  Current regimen includes: Mounjaro 7.5mg weekly  Rationale for plan: Patient tolerating dose increase of Mounjaro well. Has noticed reduced appetite and is overall feeling well. Due to adequate rate of weight loss and patient preference, plant to continue current regimen and follow-up in 4 weeks.    Medication Changes:  CONTINUE  Mounjaro 7.5mg once weekly         Relevant Orders    Follow Up In [facility] - Pharmacy    Type 2 diabetes mellitus with skin complication (Multi)     Patient's A1c is 5.7% on 5/21/24. Goal A1c <6.5%.   Patient's SMBGs are at goal. No further hypoglycemia.     Rationale for plan: Currently well-controlled. Titrating Mounjaro as needed for metabolic benefits.         Relevant Medications    tirzepatide (Mounjaro) 7.5 mg/0.5 mL pen injector    Other Relevant Orders    Follow Up In [facility] - Pharmacy     Patient Education:  Counseled patient on relevant MOA, expectations, side effects, duration of therapy, contraindications, administration, and monitoring parameters.  All questions and concerns addressed. Contact pharmacist with any further questions or concerns prior to next appointment.    Clinical Pharmacist follow-up: 9/26/24 at 4:00pm, Telehealth visit    Thank you,  Loli Guzman, PharmD  Clinical Pharmacist  [phone]    Continue all meds under the continuation of care with the referring provider and clinical pharmacy team.  Verbal consent to manage patient's drug therapy was obtained from the patient. They were informed they may decline to participate or withdraw from participation in pharmacy services at any time.

## 2024-09-26 ENCOUNTER — APPOINTMENT (OUTPATIENT)
Dept: PHARMACY | Facility: HOSPITAL | Age: 39
End: 2024-09-26
Payer: COMMERCIAL

## 2024-09-26 DIAGNOSIS — E66.813 CLASS 3 SEVERE OBESITY DUE TO EXCESS CALORIES WITH SERIOUS COMORBIDITY AND BODY MASS INDEX (BMI) OF 40.0 TO 44.9 IN ADULT: ICD-10-CM

## 2024-09-26 DIAGNOSIS — E66.01 CLASS 3 SEVERE OBESITY DUE TO EXCESS CALORIES WITH SERIOUS COMORBIDITY AND BODY MASS INDEX (BMI) OF 40.0 TO 44.9 IN ADULT: ICD-10-CM

## 2024-09-26 DIAGNOSIS — E11.628 TYPE 2 DIABETES MELLITUS WITH OTHER SKIN COMPLICATION, WITHOUT LONG-TERM CURRENT USE OF INSULIN: ICD-10-CM

## 2024-09-26 PROCEDURE — RXMED WILLOW AMBULATORY MEDICATION CHARGE

## 2024-09-26 RX ORDER — TIRZEPATIDE 7.5 MG/.5ML
7.5 INJECTION, SOLUTION SUBCUTANEOUS
Qty: 2 ML | Refills: 0 | Status: SHIPPED | OUTPATIENT
Start: 2024-09-26

## 2024-09-30 ENCOUNTER — PHARMACY VISIT (OUTPATIENT)
Dept: PHARMACY | Facility: CLINIC | Age: 39
End: 2024-09-30
Payer: MEDICARE

## 2024-09-30 NOTE — ASSESSMENT & PLAN NOTE
Patient's current weight reported as 286 lbs. Has lost 33 lbs. (10% of TBW) since starting therapy plan.  Current regimen includes: Mounjaro 7.5mg weekly  Rationale for plan: Patient tolerating current dose of Mounjaro well. Due to adequate rate of weight loss and patient preference, plant to continue current regimen and follow-up in 4 weeks.     Medication Changes:  CONTINUE  Mounjaro 7.5mg once weekly

## 2024-09-30 NOTE — PROGRESS NOTES
"  Clinical Pharmacy Appointment    Patient ID: Dipesh Morin \"Rachel" is a 39 y.o. male who presents for Diabetes and Obesity.    Pt is here for Follow Up appointment.   Referring Provider: Lang Mancia, *  PCP: Lang Mancia MD, last visit: 5/21/24, next visit: 11/19/24    Subjective   HPI  PMH significant for T2DM, obesity, SIHRA, HTN, GERD.   Special needs/barriers to therapy: None identified    Medication System Management  Patients preferred pharmacy: [facility] delivery  Adherence/Organization: No current concerns  Affordability/Accessibility: No current concerns    Drug Interactions  No relevant drug interactions were noted.      DIABETES MELLITUS Type 2:    Known diabetic complications: obesity.  Hx or FH Hx of MTC/MEN2?: No, Pancreatitis?: No, Gastroparesis?: No, UTI/Yeast Infections?: No    Follows with Endocrinology?: No  Diabetic Eye Exam: Up to date, completed Jan 2024  Monofilament Foot Exam: Up to date, completed May 2024     Current diabetic medications include:  Mounjaro 7.5mg once weekly (Fridays)  Previous medications: Rybelsus 7mg, glimepiride, metformin    Clarifications to above regimen: None  Adverse Effects: None    Glucose Readings:  Glucometer/CGM Type: Freestyle Lite   Patient tests BG 1 times per day    Previous home BG readings: (6/27/24) -105  Current home BG readings: BG 80's-100's    Any episodes of hypoglycemia? No  Did patient treat episode of hypoglycemia appropriately? N/A  Does the patient have a prescription for ready-to-use Glucagon? N/A    Lifestyle:  Diet: 3 meals/day. Has noticed reduced appetite.  Physical Activity: used to ride bikes, not for past 4 months     Risk Reducing Medications:  Statin? No - indicated for updated lipid panel at follow-up  ACE-I/ARB? Yes    WEIGHT LOSS  BMI Readings from Last 1 Encounters:   05/21/24 43.26 kg/m²   Starting weight: 319 lbs. (5/21/24)   Previous weight: 294 lbs. (8/1/24)   Current weight: 286 " lbs.      Preventative Care  Immunizations Needed: Prevnar and Tdap  Tobacco Use: non-smoker    Objective   Allergies   Allergen Reactions    Penicillins Unknown and Rash     Social History     Social History Narrative    Not on file      Medication Review  Current Outpatient Medications   Medication Instructions    FreeStyle Freedom Lite kit TEST EVERY DAY    FreeStyle Lancets 28 gauge USE ONCE DAILY AS DIRECTED    FreeStyle Test strip Daily RT    irbesartan (AVAPRO) 150 mg, oral, Nightly    metFORMIN (MOD) (GLUMETZA) 1,000 mg, oral, Daily    metoprolol succinate XL (Toprol-XL) 25 mg 24 hr tablet 1 tablet, oral, Daily    Mounjaro 7.5 mg, subcutaneous, Every 7 days    omeprazole (PriLOSEC) 40 mg DR capsule 1 capsule, oral, Daily    omeprazole (PriLOSEC) 40 mg DR capsule TAKE 1 CAPSULE BY MOUTH EVERY DAY    Rybelsus 7 mg, oral, Daily      Vitals  BP Readings from Last 2 Encounters:   05/21/24 124/82   11/14/23 128/80     Labs  A1C  Lab Results   Component Value Date    HGBA1C 5.7 05/21/2024    HGBA1C 5.3 11/14/2023    HGBA1C 5.0 05/23/2023     BMP  Lab Results   Component Value Date    CALCIUM 9.7 09/06/2022     (L) 09/06/2022    K 3.8 09/06/2022    CO2 28 09/06/2022    CL 98 09/06/2022    BUN 15 09/06/2022    CREATININE 0.89 09/06/2022     LFTs  Lab Results   Component Value Date    ALT 39 09/06/2022    AST 23 09/06/2022    ALKPHOS 130 (H) 09/06/2022    BILITOT 0.5 09/06/2022     FLP  Lab Results   Component Value Date    TRIG 195 (H) 09/06/2022    CHOL 203 (H) 09/06/2022    LDLF 127 (H) 09/06/2022    HDL 37.0 (A) 09/06/2022     Urine Microalbumin  Lab Results   Component Value Date    MICROALBCREA SEE COMMENT 09/06/2022     Weight Management  Wt Readings from Last 3 Encounters:   05/21/24 145 kg (319 lb)   11/14/23 131 kg (288 lb)   05/23/23 124 kg (273 lb)      There is no height or weight on file to calculate BMI.     Assessment/Plan   Problem List Items Addressed This Visit       Class 3 severe obesity due  to excess calories with serious comorbidity and body mass index (BMI) of 40.0 to 44.9 in adult (Multi)     Patient's current weight reported as 286 lbs. Has lost 33 lbs. (10% of TBW) since starting therapy plan.  Current regimen includes: Mounjaro 7.5mg weekly  Rationale for plan: Patient tolerating current dose of Mounjaro well. Due to adequate rate of weight loss and patient preference, plant to continue current regimen and follow-up in 4 weeks.     Medication Changes:  CONTINUE  Mounjaro 7.5mg once weekly          Relevant Orders    Follow Up In [facility] - Pharmacy    Type 2 diabetes mellitus with skin complication (Multi)     Patient's A1c is 5.7% on 5/21/24. Goal A1c <6.5%.   Patient's SMBGs are at goal.     Rationale for plan: BG is well-controlled. Continue current regimen.         Relevant Medications    tirzepatide (Mounjaro) 7.5 mg/0.5 mL pen injector    Other Relevant Orders    Follow Up In [facility] - Pharmacy     Patient Education:  Counseled patient on relevant MOA, expectations, side effects, duration of therapy, contraindications, administration, and monitoring parameters.  All questions and concerns addressed. Contact pharmacist with any further questions or concerns prior to next appointment.    Clinical Pharmacist follow-up: 10/24/24 at 4:00pm, Telehealth visit    Thank you,  Loli Guzman, PharmD  Clinical Pharmacist  [phone]    Continue all meds under the continuation of care with the referring provider and clinical pharmacy team.  Verbal consent to manage patient's drug therapy was obtained from the patient. They were informed they may decline to participate or withdraw from participation in pharmacy services at any time.

## 2024-09-30 NOTE — ASSESSMENT & PLAN NOTE
Patient's A1c is 5.7% on 5/21/24. Goal A1c <6.5%.   Patient's SMBGs are at goal.     Rationale for plan: BG is well-controlled. Continue current regimen.

## 2024-10-24 ENCOUNTER — APPOINTMENT (OUTPATIENT)
Dept: PHARMACY | Facility: HOSPITAL | Age: 39
End: 2024-10-24
Payer: COMMERCIAL

## 2024-10-24 DIAGNOSIS — E11.628 TYPE 2 DIABETES MELLITUS WITH OTHER SKIN COMPLICATION, WITHOUT LONG-TERM CURRENT USE OF INSULIN: ICD-10-CM

## 2024-10-24 DIAGNOSIS — E66.01 CLASS 3 SEVERE OBESITY DUE TO EXCESS CALORIES WITH SERIOUS COMORBIDITY AND BODY MASS INDEX (BMI) OF 40.0 TO 44.9 IN ADULT: ICD-10-CM

## 2024-10-24 DIAGNOSIS — E66.813 CLASS 3 SEVERE OBESITY DUE TO EXCESS CALORIES WITH SERIOUS COMORBIDITY AND BODY MASS INDEX (BMI) OF 40.0 TO 44.9 IN ADULT: ICD-10-CM

## 2024-10-24 PROCEDURE — RXMED WILLOW AMBULATORY MEDICATION CHARGE

## 2024-10-24 RX ORDER — TIRZEPATIDE 10 MG/.5ML
10 INJECTION, SOLUTION SUBCUTANEOUS
Qty: 2 ML | Refills: 0 | Status: SHIPPED | OUTPATIENT
Start: 2024-10-24

## 2024-10-28 ENCOUNTER — PHARMACY VISIT (OUTPATIENT)
Dept: PHARMACY | Facility: CLINIC | Age: 39
End: 2024-10-28
Payer: MEDICARE

## 2024-11-19 ENCOUNTER — APPOINTMENT (OUTPATIENT)
Dept: PRIMARY CARE | Facility: CLINIC | Age: 39
End: 2024-11-19
Payer: COMMERCIAL

## 2024-11-19 VITALS
RESPIRATION RATE: 14 BRPM | WEIGHT: 284 LBS | SYSTOLIC BLOOD PRESSURE: 122 MMHG | HEART RATE: 89 BPM | HEIGHT: 72 IN | DIASTOLIC BLOOD PRESSURE: 76 MMHG | BODY MASS INDEX: 38.47 KG/M2 | OXYGEN SATURATION: 96 %

## 2024-11-19 DIAGNOSIS — I10 PRIMARY HYPERTENSION: ICD-10-CM

## 2024-11-19 DIAGNOSIS — E11.9 CONTROLLED TYPE 2 DIABETES MELLITUS WITHOUT COMPLICATION, WITHOUT LONG-TERM CURRENT USE OF INSULIN (MULTI): ICD-10-CM

## 2024-11-19 DIAGNOSIS — B36.0 TINEA VERSICOLOR: ICD-10-CM

## 2024-11-19 DIAGNOSIS — E11.628 TYPE 2 DIABETES MELLITUS WITH OTHER SKIN COMPLICATIONS: Primary | ICD-10-CM

## 2024-11-19 DIAGNOSIS — K21.9 GASTROESOPHAGEAL REFLUX DISEASE, UNSPECIFIED WHETHER ESOPHAGITIS PRESENT: ICD-10-CM

## 2024-11-19 DIAGNOSIS — G47.33 OSA (OBSTRUCTIVE SLEEP APNEA): ICD-10-CM

## 2024-11-19 LAB — POC HEMOGLOBIN A1C: 5 % (ref 4.2–6.5)

## 2024-11-19 PROCEDURE — 99214 OFFICE O/P EST MOD 30 MIN: CPT | Performed by: INTERNAL MEDICINE

## 2024-11-19 PROCEDURE — 3008F BODY MASS INDEX DOCD: CPT | Performed by: INTERNAL MEDICINE

## 2024-11-19 PROCEDURE — 4010F ACE/ARB THERAPY RXD/TAKEN: CPT | Performed by: INTERNAL MEDICINE

## 2024-11-19 PROCEDURE — 3078F DIAST BP <80 MM HG: CPT | Performed by: INTERNAL MEDICINE

## 2024-11-19 PROCEDURE — 83036 HEMOGLOBIN GLYCOSYLATED A1C: CPT | Performed by: INTERNAL MEDICINE

## 2024-11-19 PROCEDURE — 1036F TOBACCO NON-USER: CPT | Performed by: INTERNAL MEDICINE

## 2024-11-19 PROCEDURE — 3074F SYST BP LT 130 MM HG: CPT | Performed by: INTERNAL MEDICINE

## 2024-11-19 RX ORDER — IRBESARTAN 150 MG/1
150 TABLET ORAL NIGHTLY
Qty: 90 TABLET | Refills: 3 | Status: SHIPPED | OUTPATIENT
Start: 2024-11-19 | End: 2025-11-19

## 2024-11-19 RX ORDER — DOXYLAMINE SUCCINATE 25 MG
TABLET ORAL 2 TIMES DAILY
Qty: 141 G | Refills: 1 | Status: SHIPPED | OUTPATIENT
Start: 2024-11-19

## 2024-11-19 RX ORDER — OMEPRAZOLE 40 MG/1
40 CAPSULE, DELAYED RELEASE ORAL DAILY
Qty: 90 CAPSULE | Refills: 3 | Status: SHIPPED | OUTPATIENT
Start: 2024-11-19

## 2024-11-19 RX ORDER — FLUCONAZOLE 100 MG/1
200 TABLET ORAL ONCE
Qty: 2 TABLET | Refills: 0 | Status: SHIPPED | OUTPATIENT
Start: 2024-11-19 | End: 2024-11-19

## 2024-11-19 ASSESSMENT — ENCOUNTER SYMPTOMS
VOMITING: 0
SHORTNESS OF BREATH: 0
DIAPHORESIS: 0
JOINT SWELLING: 0
CONSTIPATION: 0
FEVER: 0
COUGH: 0
NAUSEA: 0
DIARRHEA: 0
CHILLS: 0
MYALGIAS: 0
UNEXPECTED WEIGHT CHANGE: 0

## 2024-11-19 NOTE — PATIENT INSTRUCTIONS
A1C TODAY 5.0%, WELL WITHIN NON-DIABETIC RANGE ON MOUNJARO ALONE, EXCELLENT WORK    2.  FOR TINEA VERSICOLOR RASH, FLUCONAZOLE PILLS TAKE TWO PILLS AT SAME TIME ONCE, THEN USE CREAM AS NEEDED.  INCREASE AIR CIRCULATION TO THE BACK WHEN DRIVING    3.  HOME SLEEP APNEA TEST IS ORDERED TO HOPEFULLY RESOLVE THE SEVERE SLEEP APNEA ISSUE SO IT CAN COME OFF DOT    4.  REFILLS SENT IN    5.  REGULAR YEARLY EYE EXAMS ARE RECOMMENDED    6.  FOLLOW UP 6 MONTHS OR AS NEEDED.

## 2024-11-19 NOTE — PROGRESS NOTES
"Subjective   Dipesh Morin \"Thomas\" is a 39 y.o. male who presents for  FOLLOW UP   DEFERS FLU SHOT    RF NEEDED ON TEST STRIPS    HPI   ON [facility]    PHARMACY CALLS AND HELPS    NEED ANOTHER SLEEP TEST TO TRY TO GET OFF OF SLEEP MACHINE    FEEL GOOD, NON NODDING OFF AT WORK, FEEL WELL RESTED.     POUNDS WAS HAVING SEVERE SLEEP APNEA, BUT NEEDS TO TEST OFF THE MACHINE SO SHE CAN PASS DOT PHYSICAL    NO PROBLEMS TO SPEAK OF    RASH/ITCH CAM BACK ON HIS BACK    Review of Systems   Constitutional:  Negative for chills, diaphoresis, fever and unexpected weight change.   Respiratory:  Negative for cough and shortness of breath.    Cardiovascular:  Negative for chest pain and leg swelling.   Gastrointestinal:  Negative for constipation, diarrhea, nausea and vomiting.   Musculoskeletal:  Negative for joint swelling and myalgias.       Objective   /76   Pulse 89   Resp 14   Ht 1.829 m (6')   Wt 129 kg (284 lb)   SpO2 96%   BMI 38.52 kg/m²     Physical Exam  Vitals reviewed.   Constitutional:       General: He is not in acute distress.     Appearance: He is not ill-appearing.   Cardiovascular:      Rate and Rhythm: Normal rate and regular rhythm.      Pulses: Normal pulses.      Heart sounds:      No gallop.   Pulmonary:      Breath sounds: Normal breath sounds. No wheezing, rhonchi or rales.   Abdominal:      General: Abdomen is flat. Bowel sounds are normal.      Palpations: Abdomen is soft.      Tenderness: There is no guarding or rebound.   Musculoskeletal:      Right lower leg: No edema.      Left lower leg: No edema.   Skin:     Comments: TINEA VERSICOLOR ON BACK AND FLANKS         Assessment/Plan   Problem List Items Addressed This Visit       Tinea versicolor    Relevant Medications    fluconazole (Diflucan) 100 mg tablet    miconazole (Micatin) 2 % cream    Hypertension    Relevant Medications    irbesartan (Avapro) 150 mg tablet    GERD (gastroesophageal reflux disease)    Relevant Medications    " omeprazole (PriLOSEC) 40 mg DR capsule     Other Visit Diagnoses       Type 2 diabetes mellitus with other skin complications    -  Primary    Controlled type 2 diabetes mellitus without complication, without long-term current use of insulin (Multi)        Relevant Orders    POCT glycosylated hemoglobin (Hb A1C) manually resulted    SHIRA (obstructive sleep apnea)        Relevant Orders    Home sleep apnea test (HSAT)          Patient Instructions    A1C TODAY 5.0%, WELL WITHIN NON-DIABETIC RANGE ON MOUNJARO ALONE, EXCELLENT WORK    2.  FOR TINEA VERSICOLOR RASH, FLUCONAZOLE PILLS TAKE TWO PILLS AT SAME TIME ONCE, THEN USE CREAM AS NEEDED.  INCREASE AIR CIRCULATION TO THE BACK WHEN DRIVING    3.  HOME SLEEP APNEA TEST IS ORDERED TO HOPEFULLY RESOLVE THE SEVERE SLEEP APNEA ISSUE SO IT CAN COME OFF DOT    4.  REFILLS SENT IN    5.  REGULAR YEARLY EYE EXAMS ARE RECOMMENDED    6.  FOLLOW UP 6 MONTHS OR AS NEEDED.

## 2024-11-21 ENCOUNTER — APPOINTMENT (OUTPATIENT)
Dept: PHARMACY | Facility: HOSPITAL | Age: 39
End: 2024-11-21
Payer: COMMERCIAL

## 2024-11-21 DIAGNOSIS — E66.813 CLASS 3 SEVERE OBESITY DUE TO EXCESS CALORIES WITH SERIOUS COMORBIDITY AND BODY MASS INDEX (BMI) OF 40.0 TO 44.9 IN ADULT: ICD-10-CM

## 2024-11-21 DIAGNOSIS — E66.01 CLASS 3 SEVERE OBESITY DUE TO EXCESS CALORIES WITH SERIOUS COMORBIDITY AND BODY MASS INDEX (BMI) OF 40.0 TO 44.9 IN ADULT: ICD-10-CM

## 2024-11-21 DIAGNOSIS — E11.628 TYPE 2 DIABETES MELLITUS WITH OTHER SKIN COMPLICATION, WITHOUT LONG-TERM CURRENT USE OF INSULIN: ICD-10-CM

## 2024-11-21 PROCEDURE — RXMED WILLOW AMBULATORY MEDICATION CHARGE

## 2024-11-21 RX ORDER — TIRZEPATIDE 10 MG/.5ML
10 INJECTION, SOLUTION SUBCUTANEOUS
Qty: 2 ML | Refills: 0 | Status: SHIPPED | OUTPATIENT
Start: 2024-11-21

## 2024-11-21 NOTE — PROGRESS NOTES
"  Clinical Pharmacy Appointment    Patient ID: Dipesh Morin \"Rachel" is a 39 y.o. male who presents for Diabetes and Obesity.    Pt is here for Follow Up appointment.   Referring Provider: Lang Mancia, *  PCP: Lang Mancia MD, last visit: 11/19/24, next visit: 5/13/25    Subjective   HPI  PMH significant for T2DM, obesity, SHIRA, HTN, GERD.   Special needs/barriers to therapy: None identified    Medication System Management  Patients preferred pharmacy: [facility] delivery  Adherence/Organization: No current concerns  Affordability/Accessibility: No current concerns    Drug Interactions  No relevant drug interactions were noted.      DIABETES MELLITUS Type 2:    Known diabetic complications: obesity.  Hx or FH Hx of MTC/MEN2?: No, Pancreatitis?: No, Gastroparesis?: No, UTI/Yeast Infections?: No    Follows with Endocrinology?: No  Diabetic Eye Exam: Up to date, completed Jan 2024  Monofilament Foot Exam: Up to date, completed May 2024     Current diabetic medications include:  Mounjaro 10mg once weekly (Fridays)  Previous medications: Rybelsus 7mg, glimepiride, metformin    Clarifications to above regimen: None  Adverse Effects: None    Glucose Readings:  Glucometer/CGM Type: Freestyle Lite   Patient tests BG 1 times per day    Previous home BG readings: (9/24/24) BG 83, 107, 103   Current home BG readings: BG 80's-100's    Any episodes of hypoglycemia? No  Did patient treat episode of hypoglycemia appropriately? N/A  Does the patient have a prescription for ready-to-use Glucagon? N/A    Lifestyle:  Diet: 3 meals/day. Has noticed he sometimes has cravings, but other times not hungry at all.  Physical Activity: used to ride bikes, not for past 4 months     Risk Reducing Medications:  Statin? No - indicated for updated lipid panel at follow-up  ACE-I/ARB? Yes      WEIGHT LOSS  BMI Readings from Last 1 Encounters:   11/19/24 38.52 kg/m²   Starting weight: 319 lbs. (5/21/24)   Previous weight: 286 " lbs. (9/26/24)   Current weight: 275 lbs.     Weight Goals  BMI Goal: 18.5 to 24.9  Weight Goal: 137 to 183 lbs.  Patient defined goal(s): Not reviewed      Preventative Care  Immunizations Needed: Prevnar and Tdap  Tobacco Use: non-smoker    Objective   Allergies   Allergen Reactions    Penicillins Unknown and Rash     Social History     Social History Narrative    Not on file      Medication Review  Current Outpatient Medications   Medication Instructions    FreeStyle Freedom Lite kit TEST EVERY DAY    FreeStyle Lancets 28 gauge USE ONCE DAILY AS DIRECTED    FreeStyle Test strip Daily RT    irbesartan (AVAPRO) 150 mg, oral, Nightly    miconazole (Micatin) 2 % cream Topical, 2 times daily    Mounjaro 10 mg, subcutaneous, Every 7 days    omeprazole (PRILOSEC) 40 mg, oral, Daily      Vitals  BP Readings from Last 2 Encounters:   11/19/24 122/76   05/21/24 124/82     Labs  A1C  Lab Results   Component Value Date    HGBA1C 5.0 11/19/2024    HGBA1C 5.7 05/21/2024    HGBA1C 5.3 11/14/2023     BMP  Lab Results   Component Value Date    CALCIUM 9.7 09/06/2022     (L) 09/06/2022    K 3.8 09/06/2022    CO2 28 09/06/2022    CL 98 09/06/2022    BUN 15 09/06/2022    CREATININE 0.89 09/06/2022     LFTs  Lab Results   Component Value Date    ALT 39 09/06/2022    AST 23 09/06/2022    ALKPHOS 130 (H) 09/06/2022    BILITOT 0.5 09/06/2022     FLP  Lab Results   Component Value Date    TRIG 195 (H) 09/06/2022    CHOL 203 (H) 09/06/2022    LDLF 127 (H) 09/06/2022    HDL 37.0 (A) 09/06/2022     Urine Microalbumin  Lab Results   Component Value Date    MICROALBCREA SEE COMMENT 09/06/2022     Weight Management  Wt Readings from Last 3 Encounters:   11/19/24 129 kg (284 lb)   05/21/24 145 kg (319 lb)   11/14/23 131 kg (288 lb)      There is no height or weight on file to calculate BMI.     Assessment/Plan   Problem List Items Addressed This Visit       Class 3 severe obesity due to excess calories with serious comorbidity and body  mass index (BMI) of 40.0 to 44.9 in adult     Patient's current weight reported as 275 lbs. Has lost 44 lbs. (13.8% of TBW) since starting therapy plan.  Current regimen includes: Mounjaro 10mg weekly  Rationale for plan: Patient tolerating current dose of Mounjaro well, continues to lose weight at acceptable rate. Per patient preference, continue current regimen and follow-up in 1 month.    Medication Changes:  CONTINUE  Mounjaro 10mg once weekly         Relevant Medications    tirzepatide (Mounjaro) 10 mg/0.5 mL pen injector    Other Relevant Orders    Referral to Clinical Pharmacy    Type 2 diabetes mellitus with skin complication     Patient's A1c is 5.0% on 11/19/24. Goal A1c <6.5%.   Patient's SMBGs are at goal, reports BG 80's-100's.     Rationale for plan: BG remains well-controlled, no concerns. Plan to continue current regimen.    Medication Changes:  CONTINUE  Mounjaro 10mg once weekly         Relevant Medications    tirzepatide (Mounjaro) 10 mg/0.5 mL pen injector    Other Relevant Orders    Referral to Clinical Pharmacy     Patient Education:  Counseled patient on relevant MOA, expectations, side effects, duration of therapy, contraindications, administration, and monitoring parameters.  All questions and concerns addressed. Contact pharmacist with any further questions or concerns prior to next appointment.    Clinical Pharmacist follow-up: 12/19/24 at 4:00pm, Telehealth visit    Thank you,  Loli Guzman, PharmD  Clinical Pharmacist  [phone]    Continue all meds under the continuation of care with the referring provider and clinical pharmacy team.  Verbal consent to manage patient's drug therapy was obtained from the patient. They were informed they may decline to participate or withdraw from participation in pharmacy services at any time.

## 2024-11-25 ENCOUNTER — PHARMACY VISIT (OUTPATIENT)
Dept: PHARMACY | Facility: CLINIC | Age: 39
End: 2024-11-25
Payer: MEDICARE

## 2024-11-26 ENCOUNTER — APPOINTMENT (OUTPATIENT)
Dept: PRIMARY CARE | Facility: CLINIC | Age: 39
End: 2024-11-26
Payer: COMMERCIAL

## 2024-11-26 NOTE — ASSESSMENT & PLAN NOTE
Patient's A1c is 5.0% on 11/19/24. Goal A1c <6.5%.   Patient's SMBGs are at goal, reports BG 80's-100's.     Rationale for plan: BG remains well-controlled, no concerns. Plan to continue current regimen.    Medication Changes:  CONTINUE  Mounjaro 10mg once weekly

## 2024-11-26 NOTE — ASSESSMENT & PLAN NOTE
Patient's current weight reported as 275 lbs. Has lost 44 lbs. (13.8% of TBW) since starting therapy plan.  Current regimen includes: Mounjaro 10mg weekly  Rationale for plan: Patient tolerating current dose of Mounjaro well, continues to lose weight at acceptable rate. Per patient preference, continue current regimen and follow-up in 1 month.    Medication Changes:  CONTINUE  Mounjaro 10mg once weekly

## 2024-12-10 ENCOUNTER — CLINICAL SUPPORT (OUTPATIENT)
Dept: SLEEP MEDICINE | Facility: HOSPITAL | Age: 39
End: 2024-12-10
Payer: COMMERCIAL

## 2024-12-10 DIAGNOSIS — R06.83 SNORING: ICD-10-CM

## 2024-12-10 DIAGNOSIS — G47.33 OSA (OBSTRUCTIVE SLEEP APNEA): ICD-10-CM

## 2024-12-10 PROCEDURE — 95806 SLEEP STUDY UNATT&RESP EFFT: CPT | Performed by: SPECIALIST

## 2024-12-10 NOTE — PROGRESS NOTES
Type of Study: HOME SLEEP STUDY - NOMAD     The patient received equipment and instructions for use of the [facility] Nomad HSAT 4007 device. The patient was instructed how to apply the effort belts, cannula, thermistor. It was also explained how the Nomad and oximeter components work.  The patient was asked to record their sleep for an 8-hour period.     The patient was informed of their responsibility for the device and acknowledged this by signing the HSAT device contract. The patient was asked to return the device on 12/11/2024 between the hours of 3pm to the [facility].     The patient was instructed to call 911 as usual for any medical- emergencies while at home.  The patient was also given a phone number for troubleshooting when using the device in case there were additional questions.

## 2024-12-19 ENCOUNTER — APPOINTMENT (OUTPATIENT)
Dept: PHARMACY | Facility: HOSPITAL | Age: 39
End: 2024-12-19
Payer: COMMERCIAL

## 2024-12-19 DIAGNOSIS — E66.01 CLASS 3 SEVERE OBESITY DUE TO EXCESS CALORIES WITH SERIOUS COMORBIDITY AND BODY MASS INDEX (BMI) OF 40.0 TO 44.9 IN ADULT: ICD-10-CM

## 2024-12-19 DIAGNOSIS — E11.628 TYPE 2 DIABETES MELLITUS WITH OTHER SKIN COMPLICATION, WITHOUT LONG-TERM CURRENT USE OF INSULIN: ICD-10-CM

## 2024-12-19 DIAGNOSIS — E66.813 CLASS 3 SEVERE OBESITY DUE TO EXCESS CALORIES WITH SERIOUS COMORBIDITY AND BODY MASS INDEX (BMI) OF 40.0 TO 44.9 IN ADULT: ICD-10-CM

## 2024-12-19 PROCEDURE — RXMED WILLOW AMBULATORY MEDICATION CHARGE

## 2024-12-19 RX ORDER — TIRZEPATIDE 12.5 MG/.5ML
12.5 INJECTION, SOLUTION SUBCUTANEOUS
Qty: 2 ML | Refills: 0 | Status: SHIPPED | OUTPATIENT
Start: 2024-12-19

## 2024-12-20 ENCOUNTER — PHARMACY VISIT (OUTPATIENT)
Dept: PHARMACY | Facility: CLINIC | Age: 39
End: 2024-12-20
Payer: MEDICARE

## 2024-12-20 NOTE — ASSESSMENT & PLAN NOTE
Patient's A1c is 5.0% on 11/19/24. Goal A1c <6.5%.   Patient's SMBGs are not monitored recently.     Rationale for plan: A1c at goal, no recent changes or concerns. Titrating Mounjaro for weight-loss benefits.

## 2024-12-20 NOTE — ASSESSMENT & PLAN NOTE
Patient's current weight reported as 273 lbs. Has lost 46 lbs. (14.4% of TBW) since starting therapy plan.  Current regimen includes: Mounjaro 10mg weekly   Rationale for plan: Tolerating current regimen well, no recent changes or concerns. Due to indication for further weight loss and patient preference, plan to continue titrating Mounjaro as tolerated.     Medication Changes:  INCREASE  Mounjaro 12.5mg once weekly

## 2025-01-16 ENCOUNTER — APPOINTMENT (OUTPATIENT)
Dept: PHARMACY | Facility: HOSPITAL | Age: 40
End: 2025-01-16
Payer: COMMERCIAL

## 2025-01-16 DIAGNOSIS — E66.01 CLASS 3 SEVERE OBESITY DUE TO EXCESS CALORIES WITH SERIOUS COMORBIDITY AND BODY MASS INDEX (BMI) OF 40.0 TO 44.9 IN ADULT: ICD-10-CM

## 2025-01-16 DIAGNOSIS — E11.628 TYPE 2 DIABETES MELLITUS WITH OTHER SKIN COMPLICATION, WITHOUT LONG-TERM CURRENT USE OF INSULIN: ICD-10-CM

## 2025-01-16 DIAGNOSIS — E66.813 CLASS 3 SEVERE OBESITY DUE TO EXCESS CALORIES WITH SERIOUS COMORBIDITY AND BODY MASS INDEX (BMI) OF 40.0 TO 44.9 IN ADULT: ICD-10-CM

## 2025-01-16 PROCEDURE — RXMED WILLOW AMBULATORY MEDICATION CHARGE

## 2025-01-16 RX ORDER — TIRZEPATIDE 12.5 MG/.5ML
12.5 INJECTION, SOLUTION SUBCUTANEOUS
Qty: 2 ML | Refills: 0 | Status: SHIPPED | OUTPATIENT
Start: 2025-01-16

## 2025-01-16 NOTE — PROGRESS NOTES
"  Clinical Pharmacy Appointment    Patient ID: Dipesh Morin \"Rachel" is a 39 y.o. male who presents for Diabetes and Obesity.    Pt is here for Follow Up appointment.   Referring Provider: Lang Mancia, *  PCP: Lang Mancia MD, last visit: 11/19/24, next visit: 5/13/25    Subjective   HPI  PMH significant for T2DM, obesity, SHIRA, HTN, GERD.   Special needs/barriers to therapy: None identified    Medication System Management  Patients preferred pharmacy: [facility] delivery   Adherence/Organization: No current concerns  Affordability/Accessibility: No current concerns    Drug Interactions  No relevant drug interactions were noted.    DIABETES MELLITUS Type 2:    Follows with Endocrinology?: No    Pertinent PMH Review  Known diabetic complications:   Obesity  Hx or FH Hx of MTC/MEN2?: No  Pancreatitis?: No  Gastroparesis?: No  UTI/Yeast Infections?: No    Current diabetic medications include:  Mounjaro 12.5mg once weekly (Fridays, 3 doses completed)  Previous medications: Rybelsus 7mg, glimepiride, metformin     Clarifications to above regimen: None  Adverse Effects: None    Glucose Readings  Glucometer/CGM Type: Freestyle Lite   Patient tests BG 0-1 times per day    Previous home BG readings: (11/21/24) BG 80's-100's   Current home BG readings: BG low 100's    Any episodes of hypoglycemia? No  Did patient treat episode of hypoglycemia appropriately? N/A  Does the patient have a prescription for ready-to-use Glucagon? N/A    Lifestyle  Diet: 3 meals/day. Has noticed he sometimes has cravings, but other times not hungry at all. No recent changes.  Physical Activity: No recent changes    Risk Reducing Medications  Statin? No, indicated for updated lipid panel  ACE-I/ARB? Yes    Preventative Care  Diabetic Eye Exam: Up to date, completed Jan 2024  Monofilament Foot Exam: Up to date, completed May 2024  Immunizations Needed: Prevnar and Tdap  Tobacco Use: non-smoker      WEIGHT LOSS  BMI Readings " from Last 1 Encounters:   11/19/24 38.52 kg/m²   Starting weight: 319 lbs. (5/21/24)   Previous weight: 273 lbs. (12/19/24)  Current weight: 271 lbs.      Objective   Allergies   Allergen Reactions    Penicillins Unknown and Rash     Social History     Social History Narrative    Not on file      Medication Review  Current Outpatient Medications   Medication Instructions    FreeStyle Freedom Lite kit TEST EVERY DAY    FreeStyle Lancets 28 gauge USE ONCE DAILY AS DIRECTED    FreeStyle Test strip Daily RT    irbesartan (AVAPRO) 150 mg, oral, Nightly    miconazole (Micatin) 2 % cream Topical, 2 times daily    Mounjaro 12.5 mg, subcutaneous, Every 7 days    omeprazole (PRILOSEC) 40 mg, oral, Daily      Vitals  BP Readings from Last 2 Encounters:   11/19/24 122/76   05/21/24 124/82     Labs  A1C  Lab Results   Component Value Date    HGBA1C 5.0 11/19/2024    HGBA1C 5.7 05/21/2024    HGBA1C 5.3 11/14/2023     BMP  Lab Results   Component Value Date    CALCIUM 9.7 09/06/2022     (L) 09/06/2022    K 3.8 09/06/2022    CO2 28 09/06/2022    CL 98 09/06/2022    BUN 15 09/06/2022    CREATININE 0.89 09/06/2022     LFTs  Lab Results   Component Value Date    ALT 39 09/06/2022    AST 23 09/06/2022    ALKPHOS 130 (H) 09/06/2022    BILITOT 0.5 09/06/2022     FLP  Lab Results   Component Value Date    TRIG 195 (H) 09/06/2022    CHOL 203 (H) 09/06/2022    LDLF 127 (H) 09/06/2022    HDL 37.0 (A) 09/06/2022     Urine Microalbumin  Lab Results   Component Value Date    MICROALBCREA SEE COMMENT 09/06/2022     Weight Management  Wt Readings from Last 3 Encounters:   11/19/24 129 kg (284 lb)   05/21/24 145 kg (319 lb)   11/14/23 131 kg (288 lb)      BMI Readings from Last 1 Encounters:   11/19/24 38.52 kg/m²       Assessment/Plan   Problem List Items Addressed This Visit       Class 3 severe obesity due to excess calories with serious comorbidity and body mass index (BMI) of 40.0 to 44.9 in adult     Patient's current weight reported  as 271 lbs. Has lost 48 lbs. (15% of TBW) since starting therapy plan.  Current regimen includes: Mounjaro 12.5mg weekly  Rationale for plan: Tolerating dose increase of Mounjaro well, no adverse effects. Continuing to lose weight at appropriate rate. Per patient preference, plan to continue current regimen and follow-up in 4 weeks.    Medication Changes:  CONTINUE  Mounjaro 12.5mg weekly         Relevant Medications    tirzepatide (Mounjaro) 12.5 mg/0.5 mL pen injector    Other Relevant Orders    Referral to Clinical Pharmacy    Type 2 diabetes mellitus with skin complication     Patient's A1c is 5.0% on 11/19/24. Goal A1c <6.5%.   Rationale for plan: A1c and home BG readings at goal, no recent changes or concerns. Titrating Mounjaro for weight-loss benefits.          Relevant Medications    tirzepatide (Mounjaro) 12.5 mg/0.5 mL pen injector    Other Relevant Orders    Referral to Clinical Pharmacy     Patient Education:  Counseled patient on relevant medication mechanisms of action, expectations, side effects, duration of therapy, contraindications, administration, and monitoring parameters.  All questions and concerns addressed. Contact pharmacist with any further questions or concerns prior to next appointment.    Clinical Pharmacist follow-up: 2/13/25 at 3:40pm, Telehealth visit    Thank you,  Loli Guzman, PharmD  Clinical Pharmacy Specialist  [phone]    Continue all meds under the continuation of care with the referring provider and clinical pharmacy team.  Verbal consent to manage patient's drug therapy was obtained from the patient. They were informed they may decline to participate or withdraw from participation in pharmacy services at any time.

## 2025-01-20 NOTE — ASSESSMENT & PLAN NOTE
Patient's A1c is 5.0% on 11/19/24. Goal A1c <6.5%.   Rationale for plan: A1c and home BG readings at goal, no recent changes or concerns. Titrating Mounjaro for weight-loss benefits.

## 2025-01-20 NOTE — ASSESSMENT & PLAN NOTE
Patient's current weight reported as 271 lbs. Has lost 48 lbs. (15% of TBW) since starting therapy plan.  Current regimen includes: Mounjaro 12.5mg weekly  Rationale for plan: Tolerating dose increase of Mounjaro well, no adverse effects. Continuing to lose weight at appropriate rate. Per patient preference, plan to continue current regimen and follow-up in 4 weeks.    Medication Changes:  CONTINUE  Mounjaro 12.5mg weekly

## 2025-01-21 ENCOUNTER — PHARMACY VISIT (OUTPATIENT)
Dept: PHARMACY | Facility: CLINIC | Age: 40
End: 2025-01-21
Payer: MEDICARE

## 2025-02-13 ENCOUNTER — APPOINTMENT (OUTPATIENT)
Dept: PHARMACY | Facility: HOSPITAL | Age: 40
End: 2025-02-13
Payer: COMMERCIAL

## 2025-02-13 DIAGNOSIS — E11.628 TYPE 2 DIABETES MELLITUS WITH OTHER SKIN COMPLICATION, WITHOUT LONG-TERM CURRENT USE OF INSULIN: ICD-10-CM

## 2025-02-13 DIAGNOSIS — E66.01 CLASS 3 SEVERE OBESITY DUE TO EXCESS CALORIES WITH SERIOUS COMORBIDITY AND BODY MASS INDEX (BMI) OF 40.0 TO 44.9 IN ADULT: ICD-10-CM

## 2025-02-13 DIAGNOSIS — E66.813 CLASS 3 SEVERE OBESITY DUE TO EXCESS CALORIES WITH SERIOUS COMORBIDITY AND BODY MASS INDEX (BMI) OF 40.0 TO 44.9 IN ADULT: ICD-10-CM

## 2025-02-13 PROCEDURE — RXMED WILLOW AMBULATORY MEDICATION CHARGE

## 2025-02-13 RX ORDER — BLOOD SUGAR DIAGNOSTIC
STRIP MISCELLANEOUS
Qty: 200 EACH | Refills: 3 | Status: SHIPPED | OUTPATIENT
Start: 2025-02-13

## 2025-02-13 RX ORDER — TIRZEPATIDE 12.5 MG/.5ML
12.5 INJECTION, SOLUTION SUBCUTANEOUS
Qty: 2 ML | Refills: 0 | Status: SHIPPED | OUTPATIENT
Start: 2025-02-13

## 2025-02-13 RX ORDER — LANCETS 28 GAUGE
EACH MISCELLANEOUS
Qty: 100 EACH | Refills: 3 | Status: SHIPPED | OUTPATIENT
Start: 2025-02-13

## 2025-02-13 NOTE — PROGRESS NOTES
"  Clinical Pharmacy Appointment    Patient ID: Dipesh Morin \"Thomas\" is a 39 y.o. male who presents for Diabetes and Obesity.    Pt is here for Follow Up appointment.   Referring Provider: Lang Mancia, *  PCP: Lang Mancia MD, last visit: 11/19/24, next visit: 5/13/25    Subjective   HPI  PMH significant for T2DM, obesity, SHIRA, HTN, GERD.   Special needs/barriers to therapy: None identified    Medication System Management  Patients preferred pharmacy: [facility] delivery   Adherence/Organization: No current concerns  Affordability/Accessibility: No current concerns    Drug Interactions  No relevant drug interactions were noted.    DIABETES MELLITUS Type 2:    Follows with Endocrinology?: No    Pertinent PMH Review  Known diabetic complications:   Obesity  Hx or FH Hx of MTC/MEN2?: No  Pancreatitis?: No  Gastroparesis?: No  UTI/Yeast Infections?: No    Current diabetic medications include:  Mounjaro 12.5mg once weekly (Thursdays)  Previous medications: Rybelsus 7mg, glimepiride, metformin     Clarifications to above regimen: None  Adverse Effects: Experienced stomach cramping, nausea, and vomiting 1.5 weeks ago. Household was also sick, and has not experience any symptoms outside of this time frame, so likely not related to medication.    Glucose Readings  Glucometer/CGM Type: Freestyle Lite   Patient tests BG 0-1 times per day    Previous home BG readings: (11/21/24) 's   Current home BG readings:     Any episodes of hypoglycemia? No  Did patient treat episode of hypoglycemia appropriately? N/A  Does the patient have a prescription for ready-to-use Glucagon? N/A    Lifestyle  Diet: 3 meals/day. Has noticed he sometimes has cravings, but other times not hungry at all. No recent changes.  Physical Activity: No recent changes    Risk Reducing Medications  Statin? No, indicated for updated lipid panel  ACE-I/ARB? Yes    Preventative Care  Diabetic Eye Exam: Up to date, completed Jan " 2024  Monofilament Foot Exam: Up to date, completed May 2024  Immunizations Needed: Prevnar and Tdap  Tobacco Use: non-smoker      WEIGHT LOSS  BMI Readings from Last 1 Encounters:   11/19/24 38.52 kg/m²   Starting weight: 319 lbs. (5/21/24)   Previous weight: 271 lbs. (1/16/25)  Current weight: 269 lbs.      Objective   Allergies   Allergen Reactions    Penicillins Unknown and Rash     Social History     Social History Narrative    Not on file      Medication Review  Current Outpatient Medications   Medication Instructions    FreeStyle Freedom Lite kit TEST EVERY DAY    FreeStyle Lancets 28 gauge Use to check blood glucose once daily as directed    FreeStyle Test strip Use to check blood glucose once daily as directed    irbesartan (AVAPRO) 150 mg, oral, Nightly    miconazole (Micatin) 2 % cream Topical, 2 times daily    Mounjaro 12.5 mg, subcutaneous, Every 7 days    omeprazole (PRILOSEC) 40 mg, oral, Daily      Vitals  BP Readings from Last 2 Encounters:   11/19/24 122/76   05/21/24 124/82     Labs  A1C  Lab Results   Component Value Date    HGBA1C 5.0 11/19/2024    HGBA1C 5.7 05/21/2024    HGBA1C 5.3 11/14/2023     BMP  Lab Results   Component Value Date    CALCIUM 9.7 09/06/2022     (L) 09/06/2022    K 3.8 09/06/2022    CO2 28 09/06/2022    CL 98 09/06/2022    BUN 15 09/06/2022    CREATININE 0.89 09/06/2022     LFTs  Lab Results   Component Value Date    ALT 39 09/06/2022    AST 23 09/06/2022    ALKPHOS 130 (H) 09/06/2022    BILITOT 0.5 09/06/2022     FLP  Lab Results   Component Value Date    TRIG 195 (H) 09/06/2022    CHOL 203 (H) 09/06/2022    LDLF 127 (H) 09/06/2022    HDL 37.0 (A) 09/06/2022     Urine Microalbumin  Lab Results   Component Value Date    MICROALBCREA SEE COMMENT 09/06/2022     Weight Management  Wt Readings from Last 3 Encounters:   11/19/24 129 kg (284 lb)   05/21/24 145 kg (319 lb)   11/14/23 131 kg (288 lb)      BMI Readings from Last 1 Encounters:   11/19/24 38.52 kg/m²        Assessment/Plan   Problem List Items Addressed This Visit       Class 3 severe obesity due to excess calories with serious comorbidity and body mass index (BMI) of 40.0 to 44.9 in adult    Relevant Medications    tirzepatide (Mounjaro) 12.5 mg/0.5 mL pen injector    Other Relevant Orders    Referral to Clinical Pharmacy    Type 2 diabetes mellitus with skin complication     Patient's A1c is 5.0% on 11/1/24. Goal A1c <6.5%.  Patient's current weight reported as 269 lbs. Has lost 50 lbs. (15.7% of TBW) since starting therapy plan.  Patient's SMBGs remain at goal, reports .     Rationale for plan: Currently tolerating increased dose of Mounjaro well. Experienced stomach cramping, nausea, and vomiting a week and half ago. Household was also sick, and has not experienced any symptoms outside of this time frame, so likely not related to medication. Due to BG well-controlled and patient preference, plan to continue current regimen and follow-up in 1 month.    Medication Changes:  CONTINUE  Mounjaro 12.5mg once weekly         Relevant Medications    tirzepatide (Mounjaro) 12.5 mg/0.5 mL pen injector    FreeStyle Test strip    FreeStyle Lancets 28 gauge    Other Relevant Orders    Referral to Clinical Pharmacy     Patient Education:  Counseled patient on relevant medication mechanisms of action, expectations, side effects, duration of therapy, contraindications, administration, and monitoring parameters.  All questions and concerns addressed. Contact pharmacist with any further questions or concerns prior to next appointment.    Clinical Pharmacist follow-up: 3/13/25 at 3:40pm, Telehealth visit    Thank you,  Loli Guzman [facility]  Clinical Pharmacy Specialist  [phone]    Continue all meds under the continuation of care with the referring provider and clinical pharmacy team.  Verbal consent to manage patient's drug therapy was obtained from the patient. They were informed they may decline to participate  or withdraw from participation in pharmacy services at any time.

## 2025-02-17 ENCOUNTER — PHARMACY VISIT (OUTPATIENT)
Dept: PHARMACY | Facility: CLINIC | Age: 40
End: 2025-02-17
Payer: MEDICARE

## 2025-02-18 NOTE — ASSESSMENT & PLAN NOTE
Patient's A1c is 5.0% on 11/1/24. Goal A1c <6.5%.  Patient's current weight reported as 269 lbs. Has lost 50 lbs. (15.7% of TBW) since starting therapy plan.  Patient's SMBGs remain at goal, reports .     Rationale for plan: Currently tolerating increased dose of Mounjaro well. Experienced stomach cramping, nausea, and vomiting a week and half ago. Household was also sick, and has not experienced any symptoms outside of this time frame, so likely not related to medication. Due to BG well-controlled and patient preference, plan to continue current regimen and follow-up in 1 month.    Medication Changes:  CONTINUE  Mounjaro 12.5mg once weekly

## 2025-03-13 ENCOUNTER — APPOINTMENT (OUTPATIENT)
Dept: PHARMACY | Facility: HOSPITAL | Age: 40
End: 2025-03-13
Payer: COMMERCIAL

## 2025-03-13 DIAGNOSIS — E66.01 CLASS 3 SEVERE OBESITY DUE TO EXCESS CALORIES WITH SERIOUS COMORBIDITY AND BODY MASS INDEX (BMI) OF 40.0 TO 44.9 IN ADULT: ICD-10-CM

## 2025-03-13 DIAGNOSIS — E66.813 CLASS 3 SEVERE OBESITY DUE TO EXCESS CALORIES WITH SERIOUS COMORBIDITY AND BODY MASS INDEX (BMI) OF 40.0 TO 44.9 IN ADULT: ICD-10-CM

## 2025-03-13 DIAGNOSIS — E11.628 TYPE 2 DIABETES MELLITUS WITH OTHER SKIN COMPLICATION, WITHOUT LONG-TERM CURRENT USE OF INSULIN: ICD-10-CM

## 2025-03-13 PROCEDURE — RXMED WILLOW AMBULATORY MEDICATION CHARGE

## 2025-03-13 RX ORDER — LANCETS 30 GAUGE
EACH MISCELLANEOUS
Qty: 1 EACH | Refills: 0 | Status: SHIPPED | OUTPATIENT
Start: 2025-03-13

## 2025-03-13 RX ORDER — LANCETS 33 GAUGE
EACH MISCELLANEOUS
Qty: 100 EACH | Refills: 3 | Status: SHIPPED | OUTPATIENT
Start: 2025-03-13

## 2025-03-13 RX ORDER — TIRZEPATIDE 12.5 MG/.5ML
12.5 INJECTION, SOLUTION SUBCUTANEOUS
Qty: 2 ML | Refills: 0 | Status: SHIPPED | OUTPATIENT
Start: 2025-03-13

## 2025-03-13 RX ORDER — BLOOD SUGAR DIAGNOSTIC
STRIP MISCELLANEOUS
Qty: 50 EACH | Refills: 6 | Status: SHIPPED | OUTPATIENT
Start: 2025-03-13

## 2025-03-13 NOTE — PROGRESS NOTES
"  Clinical Pharmacy Appointment    Patient ID: Dipesh Morin \"Thomas\" is a 39 y.o. male who presents for Diabetes and Obesity.    Pt is here for Follow Up appointment.   Referring Provider: Lang Mancia, *  PCP: Lang Mancia MD, last visit: 11/19/24, next visit: 5/13/25    Subjective   HPI  PMH significant for T2DM, obesity, SHIRA, HTN, GERD.   Special needs/barriers to therapy: None identified    Medication System Management  Patients preferred pharmacy: [facility] delivery   Adherence/Organization: No current concerns  Affordability/Accessibility: No current concerns    Drug Interactions  No relevant drug interactions were noted.    DIABETES MELLITUS Type 2:    Follows with Endocrinology?: No    Pertinent PMH Review  Known diabetic complications:   Obesity  Hx or FH Hx of MTC/MEN2?: No  Pancreatitis?: No  Gastroparesis?: No  UTI/Yeast Infections?: No    Current diabetic medications include:  Mounjaro 12.5mg once weekly (Thursdays)  Previous medications: Rybelsus 7mg, glimepiride, metformin     Clarifications to above regimen: None  Adverse Effects: None    Glucose Readings  Glucometer/CGM Type: Freestyle Lite   Patient tests BG 0-1 times per day    Previous home BG readings: (2/13/25)    Current home BG readings: None    Any episodes of hypoglycemia? No  Did patient treat episode of hypoglycemia appropriately? N/A  Does the patient have a prescription for ready-to-use Glucagon? N/A    Lifestyle  Diet: 3 meals/day. Not recent changes.  Physical Activity: No recent changes    Risk Reducing Medications  Statin? No, indicated for updated lipid panel  ACE-I/ARB? Yes    Preventative Care  Diabetic Eye Exam: Up to date, completed Jan 2024  Monofilament Foot Exam: Up to date, completed May 2024  Immunizations Needed: Prevnar and Tdap  Tobacco Use: non-smoker      WEIGHT LOSS  BMI Readings from Last 1 Encounters:   11/19/24 38.52 kg/m²   Starting weight: 319 lbs. (5/21/24)   Previous weight: 269 " lbs. (2/13/25)  Current weight: 268 lbs.      Objective   Allergies   Allergen Reactions    Penicillins Unknown and Rash     Social History     Social History Narrative    Not on file      Medication Review  Current Outpatient Medications   Medication Instructions    irbesartan (AVAPRO) 150 mg, oral, Nightly    miconazole (Micatin) 2 % cream Topical, 2 times daily    Mounjaro 12.5 mg, subcutaneous, Every 7 days    omeprazole (PRILOSEC) 40 mg, oral, Daily    OneTouch Delica Plus Lancet 33 gauge misc Use as directed to check blood glucose once daily.    OneTouch Ultra Test strip Use as directed to check blood glucose once daily.    OneTouch Ultra2 Meter misc Use as directed to monitor blood glucose once daily.      Vitals  BP Readings from Last 2 Encounters:   11/19/24 122/76   05/21/24 124/82     Labs  A1C  Lab Results   Component Value Date    HGBA1C 5.0 11/19/2024    HGBA1C 5.7 05/21/2024    HGBA1C 5.3 11/14/2023     BMP  Lab Results   Component Value Date    CALCIUM 9.7 09/06/2022     (L) 09/06/2022    K 3.8 09/06/2022    CO2 28 09/06/2022    CL 98 09/06/2022    BUN 15 09/06/2022    CREATININE 0.89 09/06/2022     LFTs  Lab Results   Component Value Date    ALT 39 09/06/2022    AST 23 09/06/2022    ALKPHOS 130 (H) 09/06/2022    BILITOT 0.5 09/06/2022     FLP  Lab Results   Component Value Date    TRIG 195 (H) 09/06/2022    CHOL 203 (H) 09/06/2022    LDLF 127 (H) 09/06/2022    HDL 37.0 (A) 09/06/2022     Urine Microalbumin  Lab Results   Component Value Date    MICROALBCREA SEE COMMENT 09/06/2022     Weight Management  Wt Readings from Last 3 Encounters:   11/19/24 129 kg (284 lb)   05/21/24 145 kg (319 lb)   11/14/23 131 kg (288 lb)      BMI Readings from Last 1 Encounters:   11/19/24 38.52 kg/m²       Assessment/Plan   Problem List Items Addressed This Visit       Class 3 severe obesity due to excess calories with serious comorbidity and body mass index (BMI) of 40.0 to 44.9 in adult    Relevant Medications     tirzepatide (Mounjaro) 12.5 mg/0.5 mL pen injector    Other Relevant Orders    Referral to Clinical Pharmacy    Type 2 diabetes mellitus with skin complication     Patient's A1c is 5.0% on 11/1/24. Goal A1c <6.5%.   Patient's current weight reported as 268 lbs. Has lost 51 lbs. (16% of TBW) since starting therapy plan.  Patient's SMBGs are not available, patient has been unable to refill test strips due to insurance restrictions.     Rationale for plan: Patient tolerating Mounjaro well, no further GI upset in past month. Issues refilling test strips, reviewed insurance and Freestyle supplies no longer on formulary. Plan to switch to Onetouch supplies, confirmed paid claims with [facility] pharmacy. Due to A1c at goal and patient preference, plan to continue current regimen and follow-up in 4 weeks.    Medication Changes:  CONTINUE  Mounjaro 12.5mg once weekly     Additional Instructions  Onetouch testing supplies ordered, continue to monitor FBG once daily and as needed.         Relevant Medications    tirzepatide (Mounjaro) 12.5 mg/0.5 mL pen injector    OneTouch Ultra2 Meter misc    OneTouch Ultra Test strip    OneTouch Delica Plus Lancet 33 gauge misc    Other Relevant Orders    Referral to Clinical Pharmacy     Patient Education:  Counseled patient on relevant medication mechanisms of action, expectations, side effects, duration of therapy, contraindications, administration, and monitoring parameters.  All questions and concerns addressed. Contact pharmacist with any further questions or concerns prior to next appointment.    Clinical Pharmacist follow-up: 4/10/25 at 3:40pm, Telehealth visit  Patient is not followed in [facility].    Thank you,  Loli Guzman, [facility]  Clinical Pharmacy Specialist  [phone]    Continue all meds under the continuation of care with the referring provider and clinical pharmacy team.  Verbal consent to manage patient's drug therapy was obtained from the patient. They were informed they  may decline to participate or withdraw from participation in pharmacy services at any time.

## 2025-03-17 ENCOUNTER — PHARMACY VISIT (OUTPATIENT)
Dept: PHARMACY | Facility: CLINIC | Age: 40
End: 2025-03-17
Payer: MEDICARE

## 2025-03-18 NOTE — ASSESSMENT & PLAN NOTE
Patient's A1c is 5.0% on 11/1/24. Goal A1c <6.5%.   Patient's current weight reported as 268 lbs. Has lost 51 lbs. (16% of TBW) since starting therapy plan.  Patient's SMBGs are not available, patient has been unable to refill test strips due to insurance restrictions.     Rationale for plan: Patient tolerating Mounjaro well, no further GI upset in past month. Issues refilling test strips, reviewed insurance and Freestyle supplies no longer on formulary. Plan to switch to Onetouch supplies, confirmed paid claims with [facility] pharmacy. Due to A1c at goal and patient preference, plan to continue current regimen and follow-up in 4 weeks.    Medication Changes:  CONTINUE  Mounjaro 12.5mg once weekly     Additional Instructions  Onetouch testing supplies ordered, continue to monitor FBG once daily and as needed.

## 2025-04-10 ENCOUNTER — APPOINTMENT (OUTPATIENT)
Dept: PHARMACY | Facility: HOSPITAL | Age: 40
End: 2025-04-10
Payer: COMMERCIAL

## 2025-04-10 DIAGNOSIS — E11.628 TYPE 2 DIABETES MELLITUS WITH OTHER SKIN COMPLICATION, WITHOUT LONG-TERM CURRENT USE OF INSULIN: ICD-10-CM

## 2025-04-10 DIAGNOSIS — E66.01 CLASS 3 SEVERE OBESITY DUE TO EXCESS CALORIES WITH SERIOUS COMORBIDITY AND BODY MASS INDEX (BMI) OF 40.0 TO 44.9 IN ADULT: ICD-10-CM

## 2025-04-10 DIAGNOSIS — E66.813 CLASS 3 SEVERE OBESITY DUE TO EXCESS CALORIES WITH SERIOUS COMORBIDITY AND BODY MASS INDEX (BMI) OF 40.0 TO 44.9 IN ADULT: ICD-10-CM

## 2025-04-10 PROCEDURE — RXMED WILLOW AMBULATORY MEDICATION CHARGE

## 2025-04-10 RX ORDER — TIRZEPATIDE 12.5 MG/.5ML
12.5 INJECTION, SOLUTION SUBCUTANEOUS
Qty: 2 ML | Refills: 2 | Status: SHIPPED | OUTPATIENT
Start: 2025-04-10

## 2025-04-14 ENCOUNTER — PHARMACY VISIT (OUTPATIENT)
Dept: PHARMACY | Facility: CLINIC | Age: 40
End: 2025-04-14
Payer: MEDICARE

## 2025-04-15 NOTE — PROGRESS NOTES
"  Clinical Pharmacy Appointment    Patient ID: Dipesh Morin \"Thomas\" is a 39 y.o. male who presents for Diabetes and Obesity.    Pt is here for Follow Up appointment.   Referring Provider: Lang Mancia, *  PCP: Lang Mancia MD, last visit: 11/19/24, next visit: 5/13/25    Subjective   HPI  PMH significant for T2DM, obesity, SHIRA, HTN, GERD.   Special needs/barriers to therapy: None identified    Medication System Management  Patients preferred pharmacy: [facility] delivery   Adherence/Organization: No current concerns  Affordability/Accessibility: No current concerns    Drug Interactions  No relevant drug interactions were noted.    DIABETES MELLITUS Type 2:    Follows with Endocrinology?: No    Pertinent PMH Review  Known diabetic complications:   Obesity  Hx or FH Hx of MTC/MEN2?: No  Pancreatitis?: No  Gastroparesis?: No  UTI/Yeast Infections?: No    Current diabetic medications include:  Mounjaro 12.5mg once weekly (Thursdays)  Previous medications: Rybelsus 7mg, glimepiride, metformin     Clarifications to above regimen: None  Adverse Effects: None    Glucose Readings  Glucometer/CGM Type: Freestyle Lite   Patient tests BG 0-1 times per day    Previous home BG readings: (2/13/25)    Current home BG readings: FBG 90's-100's    Any episodes of hypoglycemia? No  Did patient treat episode of hypoglycemia appropriately? N/A  Does the patient have a prescription for ready-to-use Glucagon? N/A    Lifestyle  Diet: 3 meals/day. Not recent changes  Physical Activity: No recent changes    Risk Reducing Medications  Statin? No, indicated for updated lipid panel  ACE-I/ARB? Yes    Preventative Care  Diabetic Eye Exam: Up to date, completed Jan 2024  Monofilament Foot Exam: Up to date, completed May 2024  Immunizations Needed: Prevnar and Tdap  Tobacco Use: non-smoker      WEIGHT LOSS  BMI Readings from Last 1 Encounters:   11/19/24 38.52 kg/m²   Starting weight: 319 lbs. (5/21/24)   Previous " weight: 268 lbs. (3/13/25)  Current weight: 266 lbs.      Objective   Allergies   Allergen Reactions    Penicillins Unknown and Rash     Social History     Social History Narrative    Not on file      Medication Review  Current Outpatient Medications   Medication Instructions    irbesartan (AVAPRO) 150 mg, oral, Nightly    miconazole (Micatin) 2 % cream Topical, 2 times daily    Mounjaro 12.5 mg, subcutaneous, Every 7 days    omeprazole (PRILOSEC) 40 mg, oral, Daily    OneTouch Delica Plus Lancet 33 gauge misc Use as directed to check blood glucose once daily.    OneTouch Ultra Test strip Use as directed to check blood glucose once daily.    OneTouch Ultra2 Meter misc Use as directed to monitor blood glucose once daily.      Vitals  BP Readings from Last 2 Encounters:   11/19/24 122/76   05/21/24 124/82     Labs  A1C  Lab Results   Component Value Date    HGBA1C 5.0 11/19/2024    HGBA1C 5.7 05/21/2024    HGBA1C 5.3 11/14/2023     BMP  Lab Results   Component Value Date    CALCIUM 9.7 09/06/2022     (L) 09/06/2022    K 3.8 09/06/2022    CO2 28 09/06/2022    CL 98 09/06/2022    BUN 15 09/06/2022    CREATININE 0.89 09/06/2022     LFTs  Lab Results   Component Value Date    ALT 39 09/06/2022    AST 23 09/06/2022    ALKPHOS 130 (H) 09/06/2022    BILITOT 0.5 09/06/2022     FLP  Lab Results   Component Value Date    TRIG 195 (H) 09/06/2022    CHOL 203 (H) 09/06/2022    LDLF 127 (H) 09/06/2022    HDL 37.0 (A) 09/06/2022     Urine Microalbumin  Lab Results   Component Value Date    MICROALBCREA SEE COMMENT 09/06/2022     Weight Management  Wt Readings from Last 3 Encounters:   11/19/24 129 kg (284 lb)   05/21/24 145 kg (319 lb)   11/14/23 131 kg (288 lb)      BMI Readings from Last 1 Encounters:   11/19/24 38.52 kg/m²       Assessment/Plan   Problem List Items Addressed This Visit       Class 3 severe obesity due to excess calories with serious comorbidity and body mass index (BMI) of 40.0 to 44.9 in adult    Relevant  Medications    tirzepatide (Mounjaro) 12.5 mg/0.5 mL pen injector    Other Relevant Orders    Referral to Clinical Pharmacy    Type 2 diabetes mellitus with skin complication     Patient's A1c is 5.0% on 11/19/24. Goal A1c <6.5%.  Patient's current weight reported as 266 lbs. Has lost 53 lbs. (16.6% of TBW) since starting therapy plan.  Patient's SMBGs remain at goal, FBG 90's-100's.     Rationale for plan: Patient tolerating current regimen well. No recent changes or concerns. Continuing to lose weight gradually, feeling well overall. Due to BG well controlled and patient preference, plan to continue current regimen. Follow-up in 3 months.    Medication Changes:  CONTINUE  Mounjaro 12.5mg once weekly         Relevant Medications    tirzepatide (Mounjaro) 12.5 mg/0.5 mL pen injector    Other Relevant Orders    Referral to Clinical Pharmacy     Patient Education:  Counseled patient on relevant medication mechanisms of action, expectations, side effects, duration of therapy, contraindications, administration, and monitoring parameters.  All questions and concerns addressed. Contact pharmacist with any further questions or concerns prior to next appointment.    Clinical Pharmacist follow-up: 7/24/25 at 3:40pm, Telehealth visit  Patient is not followed in [facility].    Thank you,  Loli Guzman, [facility]  Clinical Pharmacy Specialist  [phone]    Continue all meds under the continuation of care with the referring provider and clinical pharmacy team.  Verbal consent to manage patient's drug therapy was obtained from the patient. They were informed they may decline to participate or withdraw from participation in pharmacy services at any time.

## 2025-04-15 NOTE — ASSESSMENT & PLAN NOTE
Patient's A1c is 5.0% on 11/19/24. Goal A1c <6.5%.  Patient's current weight reported as 266 lbs. Has lost 53 lbs. (16.6% of TBW) since starting therapy plan.  Patient's SMBGs remain at goal, FBG 90's-100's.     Rationale for plan: Patient tolerating current regimen well. No recent changes or concerns. Continuing to lose weight gradually, feeling well overall. Due to BG well controlled and patient preference, plan to continue current regimen. Follow-up in 3 months.    Medication Changes:  CONTINUE  Mounjaro 12.5mg once weekly

## 2025-05-08 PROCEDURE — RXMED WILLOW AMBULATORY MEDICATION CHARGE

## 2025-05-12 ENCOUNTER — PHARMACY VISIT (OUTPATIENT)
Dept: PHARMACY | Facility: CLINIC | Age: 40
End: 2025-05-12
Payer: MEDICARE

## 2025-05-13 ENCOUNTER — APPOINTMENT (OUTPATIENT)
Dept: PRIMARY CARE | Facility: CLINIC | Age: 40
End: 2025-05-13
Payer: COMMERCIAL

## 2025-05-13 VITALS
DIASTOLIC BLOOD PRESSURE: 80 MMHG | OXYGEN SATURATION: 97 % | BODY MASS INDEX: 37.25 KG/M2 | SYSTOLIC BLOOD PRESSURE: 124 MMHG | HEART RATE: 88 BPM | WEIGHT: 275 LBS | HEIGHT: 72 IN | RESPIRATION RATE: 14 BRPM

## 2025-05-13 DIAGNOSIS — E11.628 TYPE 2 DIABETES MELLITUS WITH OTHER SKIN COMPLICATIONS: ICD-10-CM

## 2025-05-13 DIAGNOSIS — E11.9 TYPE 2 DIABETES MELLITUS WITHOUT COMPLICATION, WITHOUT LONG-TERM CURRENT USE OF INSULIN: ICD-10-CM

## 2025-05-13 DIAGNOSIS — M54.12 LEFT CERVICAL RADICULOPATHY: Primary | ICD-10-CM

## 2025-05-13 DIAGNOSIS — M25.312 DYSKINESIS OF LEFT SCAPULA: ICD-10-CM

## 2025-05-13 DIAGNOSIS — I10 PRIMARY HYPERTENSION: ICD-10-CM

## 2025-05-13 LAB — POC HEMOGLOBIN A1C: 4.9 % (ref 4.2–6.5)

## 2025-05-13 PROCEDURE — 3044F HG A1C LEVEL LT 7.0%: CPT | Performed by: INTERNAL MEDICINE

## 2025-05-13 PROCEDURE — 3074F SYST BP LT 130 MM HG: CPT | Performed by: INTERNAL MEDICINE

## 2025-05-13 PROCEDURE — 99214 OFFICE O/P EST MOD 30 MIN: CPT | Performed by: INTERNAL MEDICINE

## 2025-05-13 PROCEDURE — 3079F DIAST BP 80-89 MM HG: CPT | Performed by: INTERNAL MEDICINE

## 2025-05-13 PROCEDURE — 4010F ACE/ARB THERAPY RXD/TAKEN: CPT | Performed by: INTERNAL MEDICINE

## 2025-05-13 PROCEDURE — 83036 HEMOGLOBIN GLYCOSYLATED A1C: CPT | Performed by: INTERNAL MEDICINE

## 2025-05-13 PROCEDURE — 3008F BODY MASS INDEX DOCD: CPT | Performed by: INTERNAL MEDICINE

## 2025-05-13 RX ORDER — PREDNISONE 20 MG/1
TABLET ORAL
Qty: 18 TABLET | Refills: 0 | Status: SHIPPED | OUTPATIENT
Start: 2025-05-13 | End: 2025-05-22

## 2025-05-13 RX ORDER — CYCLOBENZAPRINE HCL 10 MG
10 TABLET ORAL NIGHTLY PRN
Qty: 30 TABLET | Refills: 0 | Status: SHIPPED | OUTPATIENT
Start: 2025-05-13 | End: 2025-07-12

## 2025-05-13 NOTE — PATIENT INSTRUCTIONS
A1C IS 4.9% = ESSENTIALLY NON-DIABETIC RANGE    2.  YOU HAVE A FAIRLY SEVERE STRAIN OF THE SCAPULA MUSCLES, THEY ARE SO SPASM'D THAT YOUR SHOULDER ISN'T MOVING CORRECTLY AND THE SPASM'D MUSCLES ARE PUSHING ON A NERVE THAT GOES DOWN YOUR ARM    3.  RECOMMEND PREDNISONE TAPER (NO NEED TO TAKE IBUPROFEN OR OTHER ANTI-INFLAMMATORIES WHEN TAKING THIS), MUSCLE RELAXER AT NIGHT SO YOU CAN ROLL OVER WITHOUT WAKING UP, AND PENDULUM EXERCISES AS I SHOWED YOU WITH A 5 POUND WEIGHT TO HELP THE SCAPULA RELEASE AND BECOME MORE MOBILE    4.  IF YOU DO ALL THE ABOVE AND YOU CAN'T GET BETTER IN 2 WEEKS, THEN CALL ME BACK    5.  CONTINUE PRESENT MEDS     6.  FASTING LABS ARE ORDERED FOR YOU    7.  FOLLOW UP YEARLY OR AS NEEDED

## 2025-05-13 NOTE — PROGRESS NOTES
"Subjective   Dipesh Morin \"Thomas\" is a 39 y.o. male who presents for  FOLLOW UP    SAYS HE WENT TO [place] IN APR[place] PINCHED MUSCLE IN NECK STILL FEELS TIGHTNESS DOWN LEFT SHOULDER   HPI   NECK PINCHED SLEEPING.      KINK IN NECK, NOW PAIN LEFT SHOULDER BLADE INTO THE SHOULDER    WHEN LAY DOWN GET TINGLING ON LEFT ARM INTO TOP OF LEFT HAND    GOING ON FOR A MONTH    NO ARM WEAKNESS      Review of Systems    Objective   There were no vitals taken for this visit.    Physical Exam    Assessment/Plan   Problem List Items Addressed This Visit    None    " glycosylated hemoglobin (Hb A1C) manually resulted      Dyskinesis of left scapula        Relevant Medications    cyclobenzaprine (Flexeril) 10 mg tablet          Patient Instructions    A1C IS 4.9% = ESSENTIALLY NON-DIABETIC RANGE    2.  YOU HAVE A FAIRLY SEVERE STRAIN OF THE SCAPULA MUSCLES, THEY ARE SO SPASM'D THAT YOUR SHOULDER ISN'T MOVING CORRECTLY AND THE SPASM'D MUSCLES ARE PUSHING ON A NERVE THAT GOES DOWN YOUR ARM    3.  RECOMMEND PREDNISONE TAPER (NO NEED TO TAKE IBUPROFEN OR OTHER ANTI-INFLAMMATORIES WHEN TAKING THIS), MUSCLE RELAXER AT NIGHT SO YOU CAN ROLL OVER WITHOUT WAKING UP, AND PENDULUM EXERCISES AS I SHOWED YOU WITH A 5 POUND WEIGHT TO HELP THE SCAPULA RELEASE AND BECOME MORE MOBILE    4.  IF YOU DO ALL THE ABOVE AND YOU CAN'T GET BETTER IN 2 WEEKS, THEN CALL ME BACK    5.  CONTINUE PRESENT MEDS     6.  FASTING LABS ARE ORDERED FOR YOU    7.  FOLLOW UP YEARLY OR AS NEEDED

## 2025-06-01 ASSESSMENT — ENCOUNTER SYMPTOMS
JOINT SWELLING: 0
DIARRHEA: 0
SHORTNESS OF BREATH: 0
DIAPHORESIS: 0
CONSTIPATION: 0
MYALGIAS: 0
VOMITING: 0
UNEXPECTED WEIGHT CHANGE: 0
COUGH: 0
FEVER: 0
CHILLS: 0
NAUSEA: 0

## 2025-06-05 PROCEDURE — RXMED WILLOW AMBULATORY MEDICATION CHARGE

## 2025-06-07 ENCOUNTER — PHARMACY VISIT (OUTPATIENT)
Dept: PHARMACY | Facility: CLINIC | Age: 40
End: 2025-06-07
Payer: MEDICARE

## 2025-07-03 DIAGNOSIS — E11.628 TYPE 2 DIABETES MELLITUS WITH OTHER SKIN COMPLICATION, WITHOUT LONG-TERM CURRENT USE OF INSULIN: ICD-10-CM

## 2025-07-03 DIAGNOSIS — E66.813 CLASS 3 SEVERE OBESITY DUE TO EXCESS CALORIES WITH SERIOUS COMORBIDITY AND BODY MASS INDEX (BMI) OF 40.0 TO 44.9 IN ADULT: ICD-10-CM

## 2025-07-07 PROCEDURE — RXMED WILLOW AMBULATORY MEDICATION CHARGE

## 2025-07-07 RX ORDER — TIRZEPATIDE 12.5 MG/.5ML
12.5 INJECTION, SOLUTION SUBCUTANEOUS
Qty: 2 ML | Refills: 0 | Status: SHIPPED | OUTPATIENT
Start: 2025-07-07

## 2025-07-08 ENCOUNTER — PHARMACY VISIT (OUTPATIENT)
Dept: PHARMACY | Facility: CLINIC | Age: 40
End: 2025-07-08
Payer: MEDICARE

## 2025-07-24 ENCOUNTER — APPOINTMENT (OUTPATIENT)
Dept: PHARMACY | Facility: HOSPITAL | Age: 40
End: 2025-07-24
Payer: COMMERCIAL

## 2025-07-24 DIAGNOSIS — E66.813 CLASS 3 SEVERE OBESITY DUE TO EXCESS CALORIES WITH SERIOUS COMORBIDITY AND BODY MASS INDEX (BMI) OF 40.0 TO 44.9 IN ADULT: ICD-10-CM

## 2025-07-24 DIAGNOSIS — E11.628 TYPE 2 DIABETES MELLITUS WITH OTHER SKIN COMPLICATION, WITHOUT LONG-TERM CURRENT USE OF INSULIN: ICD-10-CM

## 2025-07-24 PROCEDURE — RXMED WILLOW AMBULATORY MEDICATION CHARGE

## 2025-07-24 RX ORDER — TIRZEPATIDE 15 MG/.5ML
15 INJECTION, SOLUTION SUBCUTANEOUS WEEKLY
Qty: 2 ML | Refills: 0 | Status: SHIPPED | OUTPATIENT
Start: 2025-07-31

## 2025-07-26 ENCOUNTER — PHARMACY VISIT (OUTPATIENT)
Dept: PHARMACY | Facility: CLINIC | Age: 40
End: 2025-07-26
Payer: MEDICARE

## 2025-07-28 NOTE — PROGRESS NOTES
"  Clinical Pharmacy Appointment    Patient ID: Dipesh Morin \"Thomas\" is a 40 y.o. male who presents for Diabetes and Obesity.    Pt is here for Follow Up appointment.  Referring Provider: Lang Mancia, * - last visit: 5/13/25, next visit: 5/12/26  PCP: Lang Mancia MD     Subjective   HPI  PMH significant for T2DM, obesity, SHIRA, HTN, GERD.   Special needs/barriers to therapy: None identified    Medication Reconciliation:  No changes    Drug Interactions  No relevant drug interactions were noted.    Medication System Management  Adherence/Organization: No current concerns  Affordability/Accessibility: No current concerns  Patient's preferred pharmacy:     [facility]/pharmacy #[id number] - [place], [place] - [address]  [address] [address]  Phone: [phone] Fax: [phone]    [facility] [place] [facility] - [facility] AT Portal to Registered [facility]  [place] [address]  Phone: [phone] Fax: [phone]    [facility]  [address], [address]  [facility] [address]  Phone: [phone] Fax: [phone]       DIABETES MELLITUS Type 2:    Follows with Endocrinology?: No    Pertinent PMH Review  Known diabetic complications:   Obesity  Hx or FH Hx of MTC/MEN2?: No  Pancreatitis?: No  Gastroparesis?: No  UTI/Yeast Infections?: No    Pharmacological Therapy  Current Medications:   Mounjaro 12.5mg once weekly (Thursdays)   Previous Medications: Rybelsus 7mg, glimepiride, metformin      Clarifications to above regimen: None  Adverse Effects: None    Glucose Monitoring  Glucometer/CGM Type: Freestyle Lite  Patient tests BG 0-1 times per day    Previous home BG readings: (4/10/25) FBG 90's-100's  Current home BG readings: FBG 90, 104     Any episodes of hypoglycemia? No   Did patient treat episode of hypoglycemia appropriately? N/A  Does the patient have a prescription for ready-to-use Glucagon? Not " "Indicated    Lifestyle  Diet: Has been eating \"more freely\" recently, noticed increased appetite and cravings  Physical Activity: No recent changes    Risk-Reducing Medications  Statin? No, indicated for updated lipid panel   ACE-I/ARB? Yes    Preventative Care  Diabetic Eye Exam: Needs completed, last Jan 2024  Monofilament Foot Exam: Needs completed, last May 2024  uACR in past year? No, order pending  Immunizations Needed: Prevnar and Tdap  Tobacco Use: non-smoker      WEIGHT MANAGEMENT  BMI Readings from Last 1 Encounters:   05/13/25 37.30 kg/m²   Starting weight: 319 lbs. (5/21/24)   Previous weight: 266 lbs. (4/10/25)  Current weight: 278 lbs.    Weight Goals  Next goal: Weight -15% (271 lbs.)  Maintenance BMI Goal: 18.5 to 24.9  Maintenance Weight Goal: 136 to 184 lbs.  Patient defined goal(s): Not reviewed      Objective   Allergies[1]  Social History     Social History Narrative    Not on file      Medication Review  Current Outpatient Medications   Medication Instructions    cyclobenzaprine (FLEXERIL) 10 mg, oral, Nightly PRN    irbesartan (AVAPRO) 150 mg, oral, Nightly    miconazole (Micatin) 2 % cream Topical, 2 times daily    [START ON 7/31/2025] Mounjaro 15 mg, subcutaneous, Weekly    omeprazole (PRILOSEC) 40 mg, oral, Daily    OneTouch Delica Plus Lancet 33 gauge misc Use as directed to check blood glucose once daily.    OneTouch Ultra Test strip Use as directed to check blood glucose once daily.    OneTouch Ultra2 Meter misc Use as directed to monitor blood glucose once daily.      Vitals  BP Readings from Last 2 Encounters:   05/13/25 124/80   11/19/24 122/76     Wt Readings from Last 3 Encounters:   05/13/25 125 kg (275 lb)   11/19/24 129 kg (284 lb)   05/21/24 145 kg (319 lb)      There is no height or weight on file to calculate BMI.  Labs  A1C  Lab Results   Component Value Date    HGBA1C 4.9 05/13/2025    HGBA1C 5.0 11/19/2024    HGBA1C 5.7 05/21/2024     Metabolic Panel  Lab Results "   Component Value Date    CREATININE 0.89 09/06/2022     (L) 09/06/2022    K 3.8 09/06/2022    CALCIUM 9.7 09/06/2022    CL 98 09/06/2022    CO2 28 09/06/2022    BUN 15 09/06/2022     Liver function  Lab Results   Component Value Date    ALT 39 09/06/2022    AST 23 09/06/2022    ALKPHOS 130 (H) 09/06/2022    BILITOT 0.5 09/06/2022     Lipid Panel  Lab Results   Component Value Date    CHOL 203 (H) 09/06/2022    TRIG 195 (H) 09/06/2022    HDL 37.0 (A) 09/06/2022     Urine Microalbumin  Lab Results   Component Value Date    MICROALBCREA SEE COMMENT 09/06/2022       Assessment/Plan   Problem List Items Addressed This Visit       Class 3 severe obesity due to excess calories with serious comorbidity and body mass index (BMI) of 40.0 to 44.9 in adult    Relevant Medications    tirzepatide (Mounjaro) 15 mg/0.5 mL pen injector (Start on 7/31/2025)    Other Relevant Orders    Referral to Clinical Pharmacy    Type 2 diabetes mellitus with skin complication    Patient's A1c is 4.9% on 5/13/25. Goal A1c <6.5% reasonable due to age and few comorbidities.  Patient's current weight reported as 278 lbs. Has lost 41 lbs. (13% of TBW) since starting therapy plan.  Patient's SMBGs remain at goal, FBG 90, 104.     Rationale for plan: Patient continues to tolerating current dose of Mounjaro well. Has noticed increased appetite and cravings recently. Due to metabolic benefits and patient preference, plan to further increase Mounjaro to 15mg weekly    Medication Changes:  INCREASE  Mounjaro 15mg once weekly          Relevant Medications    tirzepatide (Mounjaro) 15 mg/0.5 mL pen injector (Start on 7/31/2025)    Other Relevant Orders    Referral to Clinical Pharmacy     Patient Education:  Counseled patient on relevant medication mechanisms of action, expectations, side effects, duration of therapy, contraindications, administration, and monitoring parameters.  All questions and concerns addressed. Contact pharmacist with any  further questions or concerns prior to next appointment.    Clinical Pharmacist follow-up: 8/21/25 at 3:40pm, Telehealth visit  Patient is not followed in [facility].    Thank you,  Loli Guzman [facility]  Clinical Pharmacy Specialist  [phone]    Continue all meds under the continuation of care with the referring provider and clinical pharmacy team.  Verbal consent to manage patient's drug therapy was obtained from the patient. They were informed they may decline to participate or withdraw from participation in pharmacy services at any time.       [1]   Allergies  Allergen Reactions    Penicillins Unknown and Rash

## 2025-07-28 NOTE — ASSESSMENT & PLAN NOTE
Patient's A1c is 4.9% on 5/13/25. Goal A1c <6.5% reasonable due to age and few comorbidities.  Patient's current weight reported as 278 lbs. Has lost 41 lbs. (13% of TBW) since starting therapy plan.  Patient's SMBGs remain at goal, FBG 90, 104.     Rationale for plan: Patient continues to tolerating current dose of Mounjaro well. Has noticed increased appetite and cravings recently. Due to metabolic benefits and patient preference, plan to further increase Mounjaro to 15mg weekly    Medication Changes:  INCREASE  Mounjaro 15mg once weekly

## 2025-08-21 ENCOUNTER — APPOINTMENT (OUTPATIENT)
Dept: PHARMACY | Facility: HOSPITAL | Age: 40
End: 2025-08-21
Payer: COMMERCIAL

## 2025-08-21 DIAGNOSIS — E66.813 CLASS 3 SEVERE OBESITY DUE TO EXCESS CALORIES WITH SERIOUS COMORBIDITY AND BODY MASS INDEX (BMI) OF 40.0 TO 44.9 IN ADULT: ICD-10-CM

## 2025-08-21 DIAGNOSIS — E11.628 TYPE 2 DIABETES MELLITUS WITH OTHER SKIN COMPLICATION, WITHOUT LONG-TERM CURRENT USE OF INSULIN: ICD-10-CM

## 2025-08-21 PROCEDURE — RXMED WILLOW AMBULATORY MEDICATION CHARGE

## 2025-08-21 RX ORDER — TIRZEPATIDE 15 MG/.5ML
15 INJECTION, SOLUTION SUBCUTANEOUS WEEKLY
Qty: 2 ML | Refills: 2 | Status: SHIPPED | OUTPATIENT
Start: 2025-08-21

## 2025-08-23 ENCOUNTER — PHARMACY VISIT (OUTPATIENT)
Dept: PHARMACY | Facility: CLINIC | Age: 40
End: 2025-08-23
Payer: MEDICARE

## 2025-11-13 ENCOUNTER — APPOINTMENT (OUTPATIENT)
Dept: PHARMACY | Facility: HOSPITAL | Age: 40
End: 2025-11-13
Payer: COMMERCIAL

## 2026-05-12 ENCOUNTER — APPOINTMENT (OUTPATIENT)
Dept: PRIMARY CARE | Facility: CLINIC | Age: 41
End: 2026-05-12
Payer: COMMERCIAL